# Patient Record
Sex: FEMALE | Race: WHITE | Employment: FULL TIME | URBAN - METROPOLITAN AREA
[De-identification: names, ages, dates, MRNs, and addresses within clinical notes are randomized per-mention and may not be internally consistent; named-entity substitution may affect disease eponyms.]

---

## 2017-01-17 ENCOUNTER — OFFICE VISIT (OUTPATIENT)
Dept: NEUROLOGY | Age: 22
End: 2017-01-17

## 2017-01-17 VITALS
BODY MASS INDEX: 20.23 KG/M2 | WEIGHT: 121.4 LBS | SYSTOLIC BLOOD PRESSURE: 104 MMHG | HEIGHT: 65 IN | RESPIRATION RATE: 20 BRPM | HEART RATE: 87 BPM | OXYGEN SATURATION: 98 % | DIASTOLIC BLOOD PRESSURE: 66 MMHG

## 2017-01-17 DIAGNOSIS — G40.B09 NONINTRACTABLE JUVENILE MYOCLONIC EPILEPSY WITHOUT STATUS EPILEPTICUS (HCC): Primary | ICD-10-CM

## 2017-01-17 RX ORDER — LEVETIRACETAM 500 MG/1
TABLET, EXTENDED RELEASE ORAL
Qty: 150 TAB | Refills: 5 | Status: SHIPPED | OUTPATIENT
Start: 2017-01-17 | End: 2017-07-11 | Stop reason: SDUPTHER

## 2017-01-17 NOTE — PATIENT INSTRUCTIONS

## 2017-01-17 NOTE — MR AVS SNAPSHOT
Visit Information Date & Time Provider Department Dept. Phone Encounter #  
 2017 10:30 AM Melonie Esparza NP Neurology [facility] [place] [phone] [id number] Upcoming Health Maintenance Date Due Hepatitis A Peds Age 1-18 (1 of 2 - Standard Series) 1998 DTaP/Tdap/Td series (1 - Tdap) 2004 HPV AGE 9Y-26Y (1 of 3 - Female 3 Dose Series) 2008 INFLUENZA AGE 9 TO ADULT 2016 Allergies as of 2017  Review Complete On: 2017 By: Melonie Esparza NP No Known Allergies Current Immunizations  Never Reviewed No immunizations on file. Not reviewed this visit You Were Diagnosed With   
  
 Codes Comments Nonintractable juvenile myoclonic epilepsy without [facility]ptic[facility] ([facility]    -  Primary ICD-10-CM: G40. B09 ICD-9-CM: 345.10 Vitals BP Pulse Resp Height(growth percentile) Weight(growth percentile) SpO2  
 104/66 (32 %/ 62 %)* 87 20 5' 5\" (1.651 m) (61 %, Z= 0.27) 121 lb 6.4 oz (55.1 kg) (36 %, Z= -0.35) 98% BMI Smoking Status 20.2 kg/m2 (30 %, Z= -0.52) Never Smoker *BP percentiles are based on NHBPEP's 4th Report Growth percentiles are based on CDC 2-20 Years data. Vitals History BMI and BSA Data Body Mass Index Body Surface Area  
 20.2 kg/m 2 1.59 m 2 [facility]erred [facility] [facility] [address] [phone] Your Updated Medication List  
  
   
This list is accurate as of: 17 11:01 AM.  Always use your most recent med list.  
  
  
  
  
 Antoinette Scarce Insert  into rectum. levETIRAcetam 500 mg ER tablet Commonly known as:  KEPPRA XR  
5 tabs daily. Same generic brand for each refill Prescriptions Sent to Pharmacy Refills  
 levETIRAcetam (KEPPRA XR) 500 mg ER tablet 5 Si tabs daily. Same generic brand for each refill  Class: Normal  
 Pharmacy: [address] [place] [address], [address]  #: [phone] Patient Instructions A Healthy Lifestyle: Care Instructions Your Care Instructions A healthy lifestyle can help you feel good, stay at a healthy weight, and have plenty of energy for both work and play. A healthy lifestyle is something you can share with your whole family. A healthy lifestyle also can lower your risk for serious health problems, such as high blood pressure, heart disease, and diabetes. You can follow a few steps listed below to improve your health and the health of your family. Follow-up care is a key part of your treatment and safety. Be sure to make and go to all appointments, and call your doctor if you are having problems. Its also a good idea to know your test results and keep a list of the medicines you take. How can you care for yourself at home? · Do not eat too much sugar, fat, or fast foods. You can still have dessert and treats now and then. The goal is moderation. · Start small to improve your eating habits. Pay attention to portion sizes, drink less juice and soda pop, and eat more fruits and vegetables. ¨ Eat a healthy amount of food. A 3-ounce serving of meat, for example, is about the size of a deck of cards. Fill the rest of your plate with vegetables and whole grains. ¨ Limit the amount of soda and sports drinks you have every day. Drink more water when you are thirsty. ¨ Eat at least 5 servings of fruits and vegetables every day. It may seem like a lot, but it is not hard to reach this goal. A serving or helping is 1 piece of fruit, 1 cup of vegetables, or 2 cups of leafy, raw vegetables. Have an apple or some carrot sticks as an afternoon snack instead of a candy bar. Try to have fruits and/or vegetables at every meal. 
· Make exercise part of your daily routine.  You may want to start with simple activities, such as walking, bicycling, or slow swimming. Try to be active 30 to 60 minutes every day. You do not need to do all 30 to 60 minutes all at once. For example, you can exercise 3 times a day for 10 or 20 minutes. Moderate exercise is safe for most people, but it is always a good idea to talk to your doctor before starting an exercise program. 
· Keep moving. Dariana Rosas the lawn, work in the garden, or [employer]. Take the stairs instead of the elevator at work. · If you smoke, quit. People who smoke have an increased risk for heart attack, stroke, cancer, and other lung illnesses. Quitting is hard, but there are ways to boost your chance of quitting tobacco for good. ¨ Use nicotine gum, patches, or lozenges. ¨ Ask your doctor about stop-smoking programs and medicines. ¨ Keep trying. In addition to reducing your risk of diseases in the future, you will notice some benefits soon after you stop using tobacco. If you have shortness of breath or asthma symptoms, they will likely get better within a few weeks after you quit. · Limit how much alcohol you drink. Moderate amounts of alcohol (up to 2 drinks a day for men, 1 drink a day for women) are okay. But drinking too much can lead to liver problems, high blood pressure, and other health problems. Family health If you have a family, there are many things you can do together to improve your health. · Eat meals together as a family as often as possible. · Eat healthy foods. This includes fruits, vegetables, lean meats and dairy, and whole grains. · Include your family in your fitness plan. Most people think of activities such as jogging or tennis as the way to fitness, but there are many ways you and your family can be more active. Anything that makes you breathe hard and gets your heart pumping is exercise. Here are some tips: 
¨ Walk to do errands or to take your child to school or the bus. ¨ Go for a family bike ride after dinner instead of watching TV. Where can you learn more? Go to http://yelitza-areli.info/. Enter B026 in the search box to learn more about \"A Healthy Lifestyle: Care Instructions. \" Current as of: July 26, 2016 Content Version: 11.1 © 6946-8047 [employer]. Care instructions adapted under license by [employer] (which disclaims liability or warranty for this information). If you have questions about a medical condition or this instruction, always ask your healthcare professional. [address] any warranty or liability for your use of this information. Introducing [facility] & HE[facility]! Frieda Lowery introduces [employer] patient portal. Now you can access parts of your medical record, email your doctor's office, and request medication refills online. 1. In your internet browser, go to [email or web address] 2. Click on the First Time User? Click Here link in the Sign In box. You will see the New Member Sign Up page. 3. Enter your [employer] Access Code exactly as it appears below. You will not need to use this code after youve completed the sign-up process. If you do not sign up before the expiration date, you must request a new code. · [employer] Access Code: [email or web address] Expires: 3/8/2017 10:16 AM 
 
4. Enter the last four digits of your Social Security Number (xxxx) and Date of Birth (mm/dd/yyyy) as indicated and click Submit. You will be taken to the next sign-up page. 5. Create a Jellit ID. This will be your [employer] login ID and cannot be changed, so think of one that is secure and easy to remember. 6. Create a [employer] password. You can change your password at any time. 7. Enter your Password Reset Question and Answer. This can be used at a later time if you forget your password. 8. Enter your e-mail address.  You will receive e-mail notification when new information is available in [employer]. 9. Click Sign Up. You can now view and download portions of your medical record. 10. Click the Download Summary menu link to download a portable copy of your medical information. If you have questions, please visit the Frequently Asked Questions section of the [employer] website. Remember, [employer] is NOT to be used for urgent needs. For medical emergencies, dial 911. Now available from your iPhone and Android! Please provide this summary of care documentation to your next provider. Your primary care clinician is listed as Hugo Huizar. If you have any questions after today's visit, please call [phone].

## 2017-01-17 NOTE — PROGRESS NOTES
Date:  2017    Name:  Fern Gomez  :  1995  MRN:  [id number]     PCP:  Rhiannon Jones MD    Chief Complaint   Patient presents with    Seizure     HISTORY OF PRESENT ILLNESS: Follow up evaluation of seizure. Last known seizure was at least two years ago. Last summer she was seen and was having some jerking episodes but no LOC. States that this is resolved and she has not had any further episodes of this. Continues to take Keppra XR without difficulty. No new issues or problems. Current Outpatient Prescriptions   Medication Sig    levETIRAcetam (KEPPRA XR) 500 mg ER tablet 5 tabs daily    DIAZEPAM (DIASTAT ACUDIAL RE) Insert  into rectum. No current facility-administered medications for this visit. No Known Allergies  Past Medical History   Diagnosis Date    B[facility] ([facility]     Depression     Epilepsy ([facility]     Falls      History reviewed. No pertinent past surgical history. Social History     Social History    Marital status: SINGLE     Spouse name: N/A    Number of children: N/A    Years of education: N/A     Occupational History    Not on file. Social History Main Topics    Smoking status: Never Smoker    Smokeless tobacco: Not on file    Alcohol use No    Drug use: Not on file    Sexual activity: Not on file     Other Topics Concern    Not on file     Social History Narrative     Family History   Problem Relation Age of Onset    Cancer Other     Seizures Other        PHYSICAL EXAMINATION:    Visit Vitals    /66    Pulse 87    Resp 20    Ht 5' 5\" (1.651 m)    Wt 55.1 kg (121 lb 6.4 oz)    SpO2 98%    BMI 20.2 kg/m2     General:  Well defined, nourished, and groomed individual in no acute distress. Neck: Supple, nontender, no bruits, no pain with resistance to active range of motion. Heart: Regular rate and rhythm, no murmurs, rub, or gallop. Normal S1S2.   Lungs:  Clear to auscultation bilaterally with equal chest expansion, no cough, no wheeze  Musculoskeletal:  Extremities revealed no edema and had full range of motion of joints. Psych:  Good mood and bright affect    NEUROLOGICAL EXAMINATION:     Mental Status:   Alert and oriented to person, place, and time. Cranial Nerves:    II, III, IV, VI:  Visual acuity grossly intact. Visual fields are normal.    Pupils are equal, round, and reactive to light and accommodation. Extra-ocular movements are full and fluid. Fundoscopic exam was benign, no ptosis or nystagmus. V-XII: Hearing is grossly intact. Facial features are symmetric, with normal sensation and strength. The palate rises symmetrically and the tongue protrudes midline. Sternocleidomastoids 5/5. Motor Examination: Normal tone, bulk, and strength, 5/5 muscle strength throughout. Coordination:  Finger to nose was normal.   No resting or intention tremor    Gait and Station:  Steady while walking. Normal arm swing. No pronator drift. No muscle wasting or fasiculations noted. Reflexes:  DTRs 2+ throughout. ASSESSMENT AND PLAN    ICD-10-CM ICD-9-CM    1. 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 B09 345.10 levETIRAcetam (KEPPRA XR) 500 mg ER tablet     Description of possible myoclonic jerking at her last office visit which were likely stress induced. None since then. If these recur, will consider follow up with 24 hour EEG and possible EMU monitoring. Epilepsy is stable on present therapy of Keppra XR. Continue with the same. Follow up in six months    [facility].  Geno Avelar

## 2017-04-19 ENCOUNTER — HOSPITAL ENCOUNTER (EMERGENCY)
Age: 22
Discharge: HOME | End: 2017-04-19
Attending: EMERGENCY MEDICINE | Admitting: EMERGENCY MEDICINE
Payer: SUBSIDIZED

## 2017-04-19 VITALS
HEART RATE: 79 BPM | SYSTOLIC BLOOD PRESSURE: 102 MMHG | DIASTOLIC BLOOD PRESSURE: 71 MMHG | BODY MASS INDEX: 19.11 KG/M2 | TEMPERATURE: 98.4 F | WEIGHT: 114.86 LBS | RESPIRATION RATE: 18 BRPM | OXYGEN SATURATION: 98 %

## 2017-04-19 DIAGNOSIS — N92.0 MENORRHAGIA WITH REGULAR CYCLE: Primary | ICD-10-CM

## 2017-04-19 LAB
APPEARANCE UR: CLEAR
BACTERIA URNS QL MICRO: NEGATIVE /HPF
BILIRUB UR QL: NEGATIVE
CLUE CELLS VAG QL WET PREP: NORMAL
COLOR UR: ABNORMAL
EPITH CASTS URNS QL MICRO: ABNORMAL /LPF
ERYTHROCYTE [DISTWIDTH] IN BLOOD BY AUTOMATED COUNT: 12.6 % (ref 11.5–14.5)
GLUCOSE UR STRIP.AUTO-MCNC: NEGATIVE MG/DL
HCG UR QL: NEGATIVE
HCT VFR BLD AUTO: 37.6 % (ref 35–47)
HGB BLD-MCNC: 12.5 G/DL (ref 11.5–16)
HGB UR QL STRIP: ABNORMAL
HYALINE CASTS URNS QL MICRO: ABNORMAL /LPF (ref 0–5)
KETONES UR QL STRIP.AUTO: NEGATIVE MG/DL
KOH PREP SPEC: NORMAL
LEUKOCYTE ESTERASE UR QL STRIP.AUTO: NEGATIVE
MCH RBC QN AUTO: 29.1 PG (ref 26–34)
MCHC RBC AUTO-ENTMCNC: 33.2 G/DL (ref 30–36.5)
MCV RBC AUTO: 87.6 FL (ref 80–99)
NITRITE UR QL STRIP.AUTO: NEGATIVE
PH UR STRIP: 6.5 [PH] (ref 5–8)
PLATELET # BLD AUTO: 274 K/UL (ref 150–400)
PROT UR STRIP-MCNC: NEGATIVE MG/DL
RBC # BLD AUTO: 4.29 M/UL (ref 3.8–5.2)
RBC #/AREA URNS HPF: ABNORMAL /HPF (ref 0–5)
SERVICE CMNT-IMP: NORMAL
SP GR UR REFRACTOMETRY: 1.03 (ref 1–1.03)
T VAGINALIS VAG QL WET PREP: NORMAL
UROBILINOGEN UR QL STRIP.AUTO: 1 EU/DL (ref 0.2–1)
WBC # BLD AUTO: 7.5 K/UL (ref 3.6–11)
WBC URNS QL MICRO: ABNORMAL /HPF (ref 0–4)

## 2017-04-19 PROCEDURE — 87491 CHLMYD TRACH DNA AMP PROBE: CPT | Performed by: PHYSICIAN ASSISTANT

## 2017-04-19 PROCEDURE — 36415 COLL VENOUS BLD VENIPUNCTURE: CPT | Performed by: PHYSICIAN ASSISTANT

## 2017-04-19 PROCEDURE — 96360 HYDRATION IV INFUSION INIT: CPT

## 2017-04-19 PROCEDURE — 87210 SMEAR WET MOUNT SALINE/INK: CPT | Performed by: PHYSICIAN ASSISTANT

## 2017-04-19 PROCEDURE — 99284 EMERGENCY DEPT VISIT MOD MDM: CPT

## 2017-04-19 PROCEDURE — 85027 COMPLETE CBC AUTOMATED: CPT | Performed by: PHYSICIAN ASSISTANT

## 2017-04-19 PROCEDURE — 81001 URINALYSIS AUTO W/SCOPE: CPT | Performed by: EMERGENCY MEDICINE

## 2017-04-19 PROCEDURE — 81025 URINE PREGNANCY TEST: CPT

## 2017-04-19 PROCEDURE — 74011250636 HC RX REV CODE- 250/636: Performed by: PHYSICIAN ASSISTANT

## 2017-04-19 RX ADMIN — SODIUM CHLORIDE 1000 ML: 900 INJECTION, SOLUTION INTRAVENOUS at 21:31

## 2017-04-19 NOTE — LETTER
[address] 
[address] [facility] DEPT 
[facility] [id number] 
[phone] Work/School Note Date: 4/19/2017 To Whom It May concern: 
 
Brandy Solorio was seen and treated today in the emergency room by the following provider(s): 
Attending Provider: Stevie Dillon MD 
Physician Assistant: Luis Acuna, [address] [place], [place] [address]spike Flores. Brandy Solorio may return to work on 4/21/17. Sincerely, RAMANDEEP Duke

## 2017-04-20 NOTE — ED NOTES
Education: Patient educated on importance of follow-up with gynecologist.     Vanita Lee even and unlabored. Skin warm, pink, and dry. Discharge instructions reviewed with patient by Elías Martinez and RN. Patient ambulatory from room with father. Gait strong and steady, no distress noted upon discharge.

## 2017-04-20 NOTE — ED PROVIDER NOTES
HPI Comments: 23year old female presenting to the ED for multiple complaints. Pt reports that she has Implanon that is scheduled to be removed in August.  Notes that her period started to happen every month a couple of months ago. Pt notes that her most recent period started last night, notes that this would be about the normal time for her to get her period. Notes that today she has gone through about 3 super tampons today. Pt reports dyspareunia in the last couple of weeks, reports pelvic pain with intercourse. No vaginal discharge other than her usual healthy white discharge. No vomiting or diarrhea, + nausea. No fever. Pt had a UTI a few weeks ago, denies urinary symptoms now. Pt also notes that she has had some pain in the left leg today, attributes pain to starting her new job which requires a lot of walking. Patient denies hx VTE, recent immobilization, exogenous estrogen use, hemoptysis, leg swelling. Pt has not seen a GYN in a couple years. PMHx: epilepsy, bipolar, depression  Social: non-smoker. Works for [employer]      Patient is a 23 y.o. female presenting with anorexia nervosa. The history is provided by the patient. Anorexia   Pertinent negatives include no chest pain, no abdominal pain and no shortness of breath. Past Medical History:   Diagnosis Date    B[facility] 1 disorder ([facility]     Depression     bipolar    Epilepsy ([facility]     Falls        History reviewed. No pertinent surgical history. Family History:   Problem Relation Age of Onset    Cancer Other     Seizures Other        Social History     Social History    Marital status: SINGLE     Spouse name: N/A    Number of children: N/A    Years of education: N/A     Occupational History    Not on file.      Social History Main Topics    Smoking status: Never Smoker    Smokeless tobacco: Not on file    Alcohol use No    Drug use: Not on file    Sexual activity: Not on file     Other Topics Concern    Not on file Social History Narrative         ALLERGIES: Review of patient's allergies indicates no known allergies. Review of Systems   Constitutional: Negative for fatigue and fever. Eyes: Negative for discharge. Respiratory: Negative for shortness of breath. Cardiovascular: Negative for chest pain. Gastrointestinal: Negative for abdominal pain and vomiting. Genitourinary: Positive for dyspareunia and vaginal bleeding. Negative for difficulty urinating, dysuria, pelvic pain and vaginal discharge. Musculoskeletal: Negative for neck stiffness. Skin: Negative for wound. Neurological: Negative for dizziness, syncope and light-headedness. Psychiatric/Behavioral: Negative for behavioral problems. All other systems reviewed and are negative. Vitals:    04/19/17 2024 04/19/17 2036 04/19/17 2039   BP:  102/71    Pulse:  79    Resp:  18    Temp:   98.4 °F (36.9 °C)   SpO2:  98%    Weight: 52.1 kg (114 lb 13.8 oz)              Physical Exam   Constitutional: She is oriented to person, place, and time. She appears well-developed and well-nourished. No distress. Pleasant WF   HENT:   Head: Normocephalic and atraumatic. Right Ear: External ear normal.   Left Ear: External ear normal.   Eyes: Conjunctivae are normal. No scleral icterus. Neck: Neck supple. No tracheal deviation present. Cardiovascular: Normal rate, regular rhythm and normal heart sounds. Exam reveals no gallop and no friction rub. No murmur heard. Pulmonary/Chest: Effort normal and breath sounds normal. No stridor. No respiratory distress. She has no wheezes. Abdominal: Soft. She exhibits no distension. There is no tenderness. There is no rebound and no guarding. Genitourinary:   Genitourinary Comments: Small amount of dark red blood in the vault  No cervical lesions  No CMT  No uterine or adnexal mass or TTP   Musculoskeletal: Normal range of motion.    Small area of pain to the left lateral distal calf, no erythema, warmth, or edema  NVID   Neurological: She is alert and oriented to person, place, and time. Skin: Skin is warm and dry. Psychiatric: She has a normal mood and affect. Her behavior is normal.   Nursing note and vitals reviewed. MDM  Number of Diagnoses or Management Options  Menorrhagia with regular cycle:   Diagnosis management comments: 23year old female presenting for vaginal bleeding, notes that she started her MP last night and bleeding was heavier than usual, used 3 tampons today. No pelvic pain or fever. Reassuring exam, VS, HGB. Discussed with pt need for GYN follow up for ongoing dyspareunia, also for PAP and yearly health maintenance. Discussed return precautions, follow up instructions. 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        Amount and/or Complexity of Data Reviewed  Clinical lab tests: ordered and reviewed  Discuss the patient with other providers: yes (Dr. Maryuri Correa, ED attending)      ED Course       Procedures

## 2017-04-20 NOTE — DISCHARGE INSTRUCTIONS
Heavy Menstrual Periods: Care Instructions  Your Care Instructions    Many women get heavy menstrual periods and painful cramps. For some women, this means passing large blood clots and changing sanitary pads or tampons often. You may also have periods that last longer than 7 days. A change in hormones or an irritation in the uterus can cause heavy bleeding. Women who are overweight are more likely to have heavy menstrual periods. But there may not be a specific cause for your heavy menstrual periods. Your doctor may recommend hormone treatments to slow or stop your periods. If a fibroid (a growth that is not cancer) is causing your heavy bleeding, your doctor may recommend surgery or other treatments to remove the growth. Because blood loss from heavy menstrual periods can make you very tired and weak (anemic), your doctor may recommend that you take extra iron. Follow-up care is a key part of your treatment and safety. Be sure to make and go to all appointments, and call your doctor if you are having problems. It's also a good idea to know your test results and keep a list of the medicines you take. How can you care for yourself at home? · Get plenty of rest.  · Keep a record of your periods. Write down when your period begins and ends and how much flow you have. That means counting the number of pads and tampons you use. Note whether they are soaked. Note any other symptoms. Take this record to your doctor appointments. · Take your medicines exactly as prescribed. Call your doctor if you think you are having a problem with your medicine. · Take pain medicines exactly as directed. ¨ If the doctor gave you a prescription medicine for pain, take it as prescribed. ¨ If you are not taking a prescription pain medicine, ask your doctor if you can take an over-the-counter medicine. · Try to reach a healthy weight. If you are trying to lose weight, do it slowly with your doctor's advice.   · If you are taking iron pills:  ¨ Try to take the pills about 1 hour before or 2 hours after meals. But you may need to take iron with some food to avoid an upset stomach. ¨ Vitamin C (from food or pills) helps your body absorb iron. Try taking iron pills with a glass of orange juice or other citrus fruit juice. ¨ Do not take antacids or drink milk or caffeine drinks (such as coffee, tea, or cola) at the same time or within 2 hours of the time that you take your iron. They can make it hard for your body to absorb the iron. ¨ Iron pills may cause stomach problems, such as heartburn, nausea, diarrhea, constipation, and cramps. Be sure to drink plenty of fluids, and include fruits, vegetables, and fiber in your diet each day. ¨ If you forget to take an iron pill, do not take a double dose of iron the next time you take a pill. ¨ Keep iron pills out of the reach of small children. An overdose of iron can be very dangerous. When should you call for help? Call 911 anytime you think you may need emergency care. For example, call if:  · You passed out (lost consciousness). · You have sudden, severe pain in your belly or pelvis. Call your doctor now or seek immediate medical care if:  · You have severe vaginal bleeding. This means that you are soaking through your usual pads or tampons each hour for 2 or more hours. · You are dizzy or lightheaded, or you feel like you may faint. · You have belly or pelvic pain when not menstruating. · You have a fever. · You have vaginal discharge with a bad odor. Watch closely for changes in your health, and be sure to contact your doctor if:  · Your heavy periods are disrupting your life. · You have vaginal bleeding when you do not expect it or after menopause. · You do not get better as expected. Where can you learn more? Go to http://yelitza-areli.info/. Enter F477 in the search box to learn more about \"Heavy Menstrual Periods: Care Instructions. \"  Current as of: October 13, 2016  Content Version: 11.2  © 3535-7652 "[employer] - [employer] Simple[employer] Fast and Elegant Tablet [employer] App", [employer]. Care instructions adapted under license by [employer] (which disclaims liability or warranty for this information). If you have questions about a medical condition or this instruction, always ask your healthcare professional. [address] any warranty or liability for your use of this information.

## 2017-04-21 LAB
C TRACH DNA SPEC QL NAA+PROBE: NEGATIVE
N GONORRHOEA DNA SPEC QL NAA+PROBE: NEGATIVE
SAMPLE TYPE: NORMAL
SERVICE CMNT-IMP: NORMAL
SPECIMEN SOURCE: NORMAL

## 2017-07-11 ENCOUNTER — OFFICE VISIT (OUTPATIENT)
Dept: NEUROLOGY | Age: 22
End: 2017-07-11

## 2017-07-11 VITALS
BODY MASS INDEX: 19.49 KG/M2 | DIASTOLIC BLOOD PRESSURE: 70 MMHG | HEIGHT: 65 IN | HEART RATE: 72 BPM | RESPIRATION RATE: 18 BRPM | TEMPERATURE: 98 F | WEIGHT: 117 LBS | SYSTOLIC BLOOD PRESSURE: 110 MMHG | OXYGEN SATURATION: 99 %

## 2017-07-11 DIAGNOSIS — G40.B09 NONINTRACTABLE JUVENILE MYOCLONIC EPILEPSY WITHOUT STATUS EPILEPTICUS (HCC): ICD-10-CM

## 2017-07-11 RX ORDER — LEVETIRACETAM 500 MG/1
TABLET, EXTENDED RELEASE ORAL
Qty: 150 TAB | Refills: 5 | Status: SHIPPED | OUTPATIENT
Start: 2017-07-11 | End: 2018-06-27 | Stop reason: SDUPTHER

## 2017-07-11 NOTE — PROGRESS NOTES
[facility] [address]   [place] [address]   [address]   [phone] SBXLZ   [phone] Fax               Chief Complaint   Patient presents with    Seizure     follow up     Current Outpatient Prescriptions   Medication Sig Dispense Refill    levETIRAcetam (KEPPRA XR) 500 mg ER tablet 5 tabs daily. Same generic brand for each refill 150 Tab 5    DIAZEPAM (DIASTAT ACUDIAL RE) Insert  into rectum. No Known Allergies  Social History   Substance Use Topics    Smoking status: Never Smoker    Smokeless tobacco: Never Used    Alcohol use No     Ms. Robson Brito returns today for follow-up juvenile myoclonic epilepsy. She is maintained on Keppra. She has not had any seizures. She has not had any occult seizure. Denies any issue. Tolerating medicines. She endorses compliance. She overall notes she is doing quite well. She was stressed having some employment issues but now has a new job at [employer]. Quite happy about that. Overall feels she is doing well. Examination  Visit Vitals    Ht 5' 5\" (1.651 m)    Wt 53.1 kg (117 lb)    BMI 19.47 kg/m2     She looks well. She is very pleasant cooperative and interactive. No icterus. No edema. She has intact cranial nerves II-XII. No nystagmus. No pronation or drift. No ataxia. Her gait is steady. Impression/Plan  Juvenile myoclonic epilepsy well-controlled on Keppra. Continue that. No signs of clinical toxicity in terms of her medications. As long as she does well follow-up in 6 months      This note was created using voice recognition software. Despite editing, there may be syntax errors. This note will not be viewable in [address]

## 2017-07-11 NOTE — PROGRESS NOTES
Follow up for seizure activity. Reports no seizure activity since last visit. Reports some twitching. No acute problems reported.

## 2017-07-11 NOTE — PATIENT INSTRUCTIONS
Information Regarding Testing     If you have physican order for a test or a medication denied by your insurance company, this does not mean the test or medication is not appropriate for you as that is a medical decision, not a decision to be made by an insurance company representative or by an [facility] physician who has not interviewed and examined you. This is a decision to be made between you and your physician. The denial of services is a contractual matter between you and your insurance company, not an issue between your physician and the insurance company. If your test or medication is denied, you can take the following steps to help resolve the issue:    1. File a complaint with the [facility] regarding your insurance company's denial of services ordered for you. You can do this either by calling them directly or by completing an on-line complaint form on the [employer]. This can be found at [email or web address]    2. Also file a formal complaint with your insurance company and ask to have the name of the person denying the service so that you may explore a legal option should you be harmed by this denial of service. Again, the fact the insurance company will not pay for the service does not mean it is not medically necessary and I would encourage you to follow through with the plan that was made with your physician    3. File a written complaint with your employer so your employer and benefit manager is aware of the poor coverage they are providing their employees. If you have medicare/medicaid, complain to your representative in the House and to your Katherine Escamilla.     [address] [facility]   Statement to Patients  April 1, 2014      In an effort to ensure the large volume of patient prescription refills is processed in the most efficient and expeditious manner, we are asking our patients to assist us by calling your Pharmacy for all prescription refills, this will include also your  Mail Order Pharmacy. The pharmacy will contact our office electronically to continue the refill process. Please do not wait until the last minute to call your pharmacy. We need at least 48 hours (2days) to fill prescriptions. We also encourage you to call your pharmacy before going to  your prescription to make sure it is ready. With regard to controlled substance prescription refill requests (narcotic refills) that need to be picked up at our office, we ask your cooperation by providing us with at least 72 hours (3days) notice that you will need a refill. We will not refill narcotic prescription refill requests after 4:00pm on any weekday, Monday through Thursday, or after 2:00pm on Fridays, or on the weekends. We encourage everyone to explore another way of getting your prescription refill request processed using Tiempo, our patient web portal through our electronic medical record system. Tiempo is an efficient and effective way to communicate your medication request directly to the office and  downloadable as an amrit on your smart phone . Tiempo also features a review functionality that allows you to view your medication list as well as leave messages for your physician. Are you ready to get connected? If so please review the attatched instructions or speak to any of our staff to get you set up right away! Thank you so much for your cooperation. Should you have any questions please contact our Practice Administrator. The Physicians and Staff,  [facility]       If we have ordered testing for you, we do not call patients with results and we do not give test results over the phone. We schedule follow up appointments so that your results can be discussed in person and any questions you have regarding them may be addressed.   If something of concern is revealed on your test, we will call you for a sooner follow up appointment. Additionally, results may be found by using the My Chart feature and one of our patient service representatives at the  can give you instructions on how to access this feature of our electronic medical record system. Learning About Living Michelle  What is a living will? A living will is a legal form you use to write down the kind of care you want at the end of your life. It is used by the health professionals who will treat you if you aren't able to decide for yourself. If you put your wishes in writing, your loved ones and others will know what kind of care you want. They won't need to guess. This can ease your mind and be helpful to others. A living will is not the same as an estate or property will. An estate will explains what you want to happen with your money and property after you die. Is a living will a legal document? A living will is a legal document. Each state has its own laws about living humphrey. If you move to another state, make sure that your living will is legal in the state where you now live. Or you might use a universal form that has been approved by many states. This kind of form can sometimes be completed and stored online. Your electronic copy will then be available wherever you have a connection to the Internet. In most cases, doctors will respect your wishes even if you have a form from a different state. · You don't need an  to complete a living will. But legal advice can be helpful if your state's laws are unclear, your health history is complicated, or your family can't agree on what should be in your living will. · You can change your living will at any time. Some people find that their wishes about end-of-life care change as their health changes. · In addition to making a living will, think about completing a medical power of  form.  This form lets you name the person you want to make end-of-life treatment decisions for you (your \"health care agent\") if you're not able to. Many hospitals and nursing homes will give you the forms you need to complete a living will and a medical power of . · Your living will is used only if you can't make or communicate decisions for yourself anymore. If you become able to make decisions again, you can accept or refuse any treatment, no matter what you wrote in your living will. · Your state may offer an online registry. This is a place where you can store your living will online so the doctors and nurses who need to treat you can find it right away. What should you think about when creating a living will? Talk about your end-of-life wishes with your family members and your doctor. Let them know what you want. That way the people making decisions for you won't be surprised by your choices. Think about these questions as you make your living will:  · Do you know enough about life support methods that might be used? If not, talk to your doctor so you know what might be done if you can't breathe on your own, your heart stops, or you're unable to swallow. · What things would you still want to be able to do after you receive life-support methods? Would you want to be able to walk? To speak? To eat on your own? To live without the help of machines? · If you have a choice, where do you want to be cared for? In your home? At a hospital or nursing home? · Do you want certain Scientologist practices performed if you become very ill? · If you have a choice at the end of your life, where would you prefer to die? At home? In a hospital or nursing home? Somewhere else? · Would you prefer to be buried or cremated? · Do you want your organs to be donated after you die? What should you do with your living will? · Make sure that your family members and your health care agent have copies of your living will. · Give your doctor a copy of your living will to keep in your medical record.  If you have more than one doctor, make sure that each one has a copy. · You may want to put a copy of your living will where it can be easily found. Where can you learn more? Go to http://yelitza-areli.info/. Enter T143 in the search box to learn more about \"Learning About Living Perroy. \"  Current as of: August 8, 2016  Content Version: 11.3  © 0519-7965 [employer]. Care instructions adapted under license by [employer] (which disclaims liability or warranty for this information). If you have questions about a medical condition or this instruction, always ask your healthcare professional. [address] any warranty or liability for your use of this information.

## 2017-07-11 NOTE — LETTER
NOTIFICATION RETURN TO WORK / SCHOOL 
 
7/11/2017 11:03 AM 
 
Ms. La Nena Horowitz [address] [address] To Whom It May Concern: 
 
La Nena Horowitz is currently under the care of [address] [place] [address]. She will return to work on: 07/11/2017. If there are questions or concerns please have the patient contact our office. Sincerely, Alessia Cohn MD

## 2018-01-29 ENCOUNTER — TELEPHONE (OUTPATIENT)
Dept: NEUROLOGY | Age: 23
End: 2018-01-29

## 2018-01-29 NOTE — TELEPHONE ENCOUNTER
----- Message from Catina Mednia sent at 1/29/2018  9:38 AM EST -----  Regarding: /telephone   Pt is requesting a call back in regards to RX. Best contact [phone].

## 2018-01-30 ENCOUNTER — TELEPHONE (OUTPATIENT)
Dept: NEUROLOGY | Age: 23
End: 2018-01-30

## 2018-01-30 DIAGNOSIS — G40.B09 NONINTRACTABLE JUVENILE MYOCLONIC EPILEPSY WITHOUT STATUS EPILEPTICUS (HCC): ICD-10-CM

## 2018-01-30 RX ORDER — LEVETIRACETAM 500 MG/1
TABLET, EXTENDED RELEASE ORAL
Qty: 150 TAB | Refills: 5 | Status: SHIPPED | OUTPATIENT
Start: 2018-01-30 | End: 2018-09-27 | Stop reason: SDUPTHER

## 2018-07-31 ENCOUNTER — OFFICE VISIT (OUTPATIENT)
Dept: NEUROLOGY | Age: 23
End: 2018-07-31

## 2018-07-31 VITALS
OXYGEN SATURATION: 99 % | DIASTOLIC BLOOD PRESSURE: 80 MMHG | WEIGHT: 150 LBS | HEART RATE: 84 BPM | SYSTOLIC BLOOD PRESSURE: 112 MMHG | BODY MASS INDEX: 24.99 KG/M2 | TEMPERATURE: 98.4 F | HEIGHT: 65 IN

## 2018-07-31 DIAGNOSIS — R63.5 WEIGHT GAIN: ICD-10-CM

## 2018-07-31 DIAGNOSIS — Z51.81 MEDICATION MONITORING ENCOUNTER: ICD-10-CM

## 2018-07-31 DIAGNOSIS — G40.B09 NONINTRACTABLE JUVENILE MYOCLONIC EPILEPSY WITHOUT STATUS EPILEPTICUS (HCC): Primary | ICD-10-CM

## 2018-07-31 RX ORDER — LEVETIRACETAM 500 MG/1
TABLET, EXTENDED RELEASE ORAL
Qty: 150 TAB | Refills: 1 | Status: SHIPPED | OUTPATIENT
Start: 2018-07-31 | End: 2018-09-27 | Stop reason: SDUPTHER

## 2018-07-31 NOTE — LETTER
NOTIFICATION RETURN TO WORK 
 
7/31/2018 10:00 AM 
 
Ms. Kaur Mendiola [address] [address] To Whom It May Concern: 
 
Kaur Mendiola was seen today by Dr. Merritt Kussmaul at the [address] [place] [address]. She will return to work/school on: 07/31/18 If there are questions or concerns please have the patient contact our office. Sincerely, Deana Archibald MD

## 2018-07-31 NOTE — PROGRESS NOTES
Wandy We-07-A 1498 13 [facility] [place] [address]  
210 [facility] [place] 35 [id number] Fax Chief Complaint Patient presents with  Seizure  
  follow up Current Outpatient Prescriptions Medication Sig Dispense Refill  levETIRAcetam (KEPPRA XR) 500 mg ER tablet TAKE FIVE TABLETS BY MOUTH DAILY 150 Tab 5  levETIRAcetam (KEPPRA XR) 500 mg ER tablet 5 tabs daily. Same generic brand for each refill 150 Tab 5  DIAZEPAM (DIASTAT ACUDIAL RE) Insert  into rectum. No Known Allergies Social History Substance Use Topics  Smoking status: Never Smoker  Smokeless tobacco: Never Used  Alcohol use No  
 
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 She has been maintained on Keppra. Since her last visit she denies any seizure. She does indicate that sometimes when she is stressed she will have the sensation that she may want to have a jerk or jerking type sensation. She has not had any staring spells. No convulsions. She is now working as a . She has been compliant with her medications. She has gained a good amount of weight noting that she has gone from about 115-120 pounds now she is weighing 150 pounds. She does endorse that she has struggled with some eating issues and she wonders if she is at a healthy weight and we discussed that her BMI is normal.  She discusses how she is coming out of her clothing etc. and she does not want to be overweight and has some concerns about gaining weight very quickly. She knows that is not the Levester [place] doing that because she has been on Keppra for a long time. She has started some Celexa for anxiety and does indicate she discussed with her physician Celexa does not do that. We discussed healthy eating and discussed eating disorders and discussed that sometimes hormonal issues can be related to weight loss.   We also discussed that she appears healthy. We discussed that sometimes one can have a disordered perception of weight and that what one perceives as being an abnormal weight certainly can be a normal weight. We discussed having her see Dr. Jim vAalos to discuss weight issues including healthy weight, weight loss strategies, healthy eating and perception of weight to give her an idea about that as that is what Dr. Nina Frias specializes in that she is willing to do that. Examination Visit Vitals  /80  Pulse 84  Temp 98.4 °F (36.9 °C)  Ht 5' 5\" (1.651 m)  Wt 68 kg (150 lb)  SpO2 99%  BMI 24.96 kg/m2 She looks well. She has no icterus. No edema. She is awake alert oriented and conversant. She has normal speech-language cognition attention. No cranial nerve deficit. No ataxia. Gait steady. Impression/Plan Juvenile myoclonic epilepsy with some indication that she may have some jerking movements when under stress and question whether this may be some breakthrough myoclonus which would certainly be concerning and to that and I did go back and review her 2000 1624 hour EEG and that was normal.  We will get a Keppra level and if that is on the lower end of normal we will go ahead and increase her dose of Keppra just to air on the side of caution. We will get the Keppra level and call her with recommendations regarding increasing her Keppra dose or not and very well may likely just increase that just again to be more cautious. Again discussed Keppra should not cause any weight gain. She is aware of that. She has been very well controlled on Keppra. In terms of her perception of abnormal weight we discussed the normal BMI and discussed issues as noted above. We will give her Dr. Zari Wesley phone number and she will make an appointment to discuss those issues with her and get advice in that regard. We will set a formal follow-up after we touch base by telephone regarding what to do about the [address]  
 
Total time: 25 min Counseling / coordination time: 15 min  
> 50% counseling / coordination?: Yes re: as documented above Aracely Lauren MD 
 
 
This note was created using voice recognition software. Despite editing, there may be syntax errors. This note will not be viewable in [address]

## 2018-07-31 NOTE — MR AVS SNAPSHOT
[address] [address] 
[phone] Patient: Mavis Bautista MRN: [id number] ABX:[id number]/[id number] Visit Information Date & Time Provider Department Dept. Phone Encounter #  
 7/31/2018  9:20 AM Sai Dawson MD [facility] [phone] [id number] Follow-up Instructions Return for well call with lab result and schedule follow up. Upcoming Health Maintenance Date Due  
 HPV Age 9Y-34Y (1 of 3 - Female 3 Dose Series) 1/17/2008 DTaP/Tdap/Td series (1 - Tdap) 1/17/2018 PAP AKA CERVICAL CYTOLOGY 1/17/2018 Influenza Age 5 to Adult 8/1/2018 Allergies as of 7/31/2018  Review Complete On: 7/23/2017 By: Sai Dawson MD  
 No Known Allergies Current Immunizations  Never Reviewed No immunizations on file. Not reviewed this visit You Were Diagnosed With   
  
 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 B09 ICD-9-CM: 345.10 Medication monitoring encounter     ICD-10-CM: Z51.81 
ICD-9-CM: V58.83 Weight gain     ICD-10-CM: R63.5 ICD-9-CM: 783.1 Vitals BP Pulse Temp Height(growth percentile) Weight(growth percentile) SpO2  
 112/80 84 98.4 °F (36.9 °C) 5' 5\" (1.651 m) 150 lb (68 kg) 99% BMI Smoking Status 24.96 kg/m2 Never Smoker BMI and BSA Data Body Mass Index Body Surface Area 24.96 kg/m 2 1.77 m 2 [facility] [address] [phone] Your Updated Medication List  
  
   
This list is accurate as of 7/31/18  9:53 AM.  Always use your most recent med list.  
  
  
  
  
 Brar Raid Insert  into rectum. * levETIRAcetam 500 mg ER tablet Commonly known as:  KEPPRA XR  
TAKE FIVE TABLETS BY MOUTH DAILY * levETIRAcetam 500 mg ER tablet Commonly known as:  KEPPRA XR  
5 tabs daily. Same generic brand for each refill * Notice: This list has 2 medication(s) that are the same as other medications prescribed for you. Read the directions carefully, and ask your doctor or other care provider to review them with you. Prescriptions Sent to Pharmacy Refills  
 levETIRAcetam (KEPPRA XR) 500 mg ER tablet 1 Si tabs daily. Same generic brand for each refill Class: Normal  
 Pharm[facility]: [address] [place], [place] [address] 25 Ph #: [phone] We Performed the Following LEVETIRACETAM (KEPPRA) [id number] CPT(R)] REFERRAL TO FAMILY PRACTICE [ATV31 Custom] Comments:  
 Questions re weight loss/ ideal weight/weight gain etc  
  
Follow-up Instructions Return for well call with lab result and schedule follow up. Referral Information Referral ID Referred By Referred To  
  
 [id number] [place], [id number] [place] [address] MD Laisha   
   [address] [place] [address] Phone: [phone] Fax: [phone] Visits Status Start Date End Date 1 New Request 18 If your referral has a status of pending review or denied, additional information will be sent to support the outcome of this decision. [facility]! [facility] introduces [email or web address] patient portal. Now you can access parts of your medical record, email your doctor's office, and request medication refills online. 1. In your internet browser, go to [email or web address] 2. Click on the First Time User? Click Here link in the Sign In box. You will see the New Member Sign Up page. 3. Enter your [email or web address].com Access Code exactly as it appears below. You will not need to use this code after youve completed the sign-up process. If you do not sign up before the expiration date, you must request a new code. · Mines.io Access Code: [email or web address] Expires: 10/29/2018  9:53 AM 
 
4. Enter the last four digits of your Social Security Number (xxxx) and Date of Birth (mm/dd/yyyy) as indicated and click Submit. You will be taken to the next sign-up page. 5. Create a Mines.io ID. This will be your Mines.io login ID and cannot be changed, so think of one that is secure and easy to remember. 6. Create a Mines.io password. You can change your password at any time. 7. Enter your Password Reset Question and Answer. This can be used at a later time if you forget your password. 8. Enter your e-mail address. You will receive e-mail notification when new information is available in [address] 9. Click Sign Up. You can now view and download portions of your medical record. 10. Click the Download Summary menu link to download a portable copy of your medical information. If you have questions, please visit the Frequently Asked Questions section of the Mines.io website. Remember, Mines.io is NOT to be used for urgent needs. For medical emergencies, dial 911. Now available from your iPhone and Android! Please provide this summary of care documentation to your next provider. Your primary care clinician is listed as Hugo Huizar. If you have any questions after today's visit, please call [phone].

## 2018-07-31 NOTE — LETTER
NOTIFICATION RETURN TO WORK 
 
7/31/2018 9:59 AM 
 
Ms. Madhuri Chin [address] [facility] [address] To Whom It May Concern: 
 
Madhuri Chin was seen at the [facility] on [address] in [place], [address] She will return to work/school on: 07/31/18 If there are questions or concerns please have the patient contact our office. Sincerely, Camelia Finley MD

## 2018-08-07 ENCOUNTER — TELEPHONE (OUTPATIENT)
Dept: NEUROLOGY | Age: 23
End: 2018-08-07

## 2018-08-07 NOTE — TELEPHONE ENCOUNTER
----- Message from Platt Knock sent at 8/7/2018  1:09 PM EDT -----  Regarding: Dr. Ruth Daly  The patient is requesting a call back from the doctor or nurse in regards to the cost of the blood work that she is supposed to have done.  ([phone]

## 2018-08-07 NOTE — TELEPHONE ENCOUNTER
Patient states that she can not afford to do blood work at this time, patient states that she doesn't have any insurance and this would cost her 200 dollars, patient would like to stay on Keppra  mg 5 tabs daily for now

## 2018-09-27 DIAGNOSIS — G40.B09 NONINTRACTABLE JUVENILE MYOCLONIC EPILEPSY WITHOUT STATUS EPILEPTICUS (HCC): ICD-10-CM

## 2018-09-27 RX ORDER — LEVETIRACETAM 500 MG/1
TABLET, EXTENDED RELEASE ORAL
Qty: 150 TAB | Refills: 5 | Status: SHIPPED | OUTPATIENT
Start: 2018-09-27 | End: 2019-04-11 | Stop reason: SDUPTHER

## 2018-09-27 NOTE — TELEPHONE ENCOUNTER
----- Message from Marco Patiño sent at 9/27/2018  1:16 PM EDT -----  Regarding: Dr. Alonzo Valdezudent  The patient is requesting that the doctor calls in a refill for Rx Keppra XR into the pharmacy.  [employer] on file) (k)[phone]

## 2018-09-27 NOTE — TELEPHONE ENCOUNTER
----- Message from Akila Long sent at 9/27/2018  1:18 PM EDT -----  Regarding: Dr. Honey Ernst  The patient is requesting a call back to confirm if she needs to have blood work done since her tremors have stopped since beginning anxiety medication.  ([phone]

## 2018-10-03 ENCOUNTER — TELEPHONE (OUTPATIENT)
Dept: NEUROLOGY | Age: 23
End: 2018-10-03

## 2018-10-03 NOTE — TELEPHONE ENCOUNTER
----- Message from Radha Rico NP sent at 9/27/2018  1:59 PM EDT -----  Can hold on blood work   Call if things change

## 2019-04-10 ENCOUNTER — TELEPHONE (OUTPATIENT)
Dept: NEUROLOGY | Age: 24
End: 2019-04-10

## 2019-04-10 DIAGNOSIS — G40.B09 NONINTRACTABLE JUVENILE MYOCLONIC EPILEPSY WITHOUT STATUS EPILEPTICUS (HCC): ICD-10-CM

## 2019-04-10 NOTE — TELEPHONE ENCOUNTER
Pt stated that she doesn't have enough medication to make it to her next appointment     levETIRAcetam (KEPPRA XR) 500 mg ER tablet

## 2019-04-11 RX ORDER — LEVETIRACETAM 500 MG/1
TABLET, EXTENDED RELEASE ORAL
Qty: 150 TAB | Refills: 0 | Status: SHIPPED | OUTPATIENT
Start: 2019-04-11 | End: 2019-05-14 | Stop reason: SDUPTHER

## 2019-05-14 ENCOUNTER — OFFICE VISIT (OUTPATIENT)
Dept: NEUROLOGY | Age: 24
End: 2019-05-14

## 2019-05-14 VITALS
WEIGHT: 138 LBS | DIASTOLIC BLOOD PRESSURE: 74 MMHG | BODY MASS INDEX: 22.99 KG/M2 | OXYGEN SATURATION: 95 % | RESPIRATION RATE: 16 BRPM | HEIGHT: 65 IN | HEART RATE: 71 BPM | SYSTOLIC BLOOD PRESSURE: 100 MMHG

## 2019-05-14 DIAGNOSIS — G40.B09 NONINTRACTABLE JUVENILE MYOCLONIC EPILEPSY WITHOUT STATUS EPILEPTICUS (HCC): ICD-10-CM

## 2019-05-14 RX ORDER — LEVETIRACETAM 500 MG/1
TABLET, EXTENDED RELEASE ORAL
Qty: 150 TAB | Refills: 11 | Status: SHIPPED | OUTPATIENT
Start: 2019-05-14 | End: 2019-12-17

## 2019-05-14 RX ORDER — LEVETIRACETAM 500 MG/1
TABLET, EXTENDED RELEASE ORAL
Qty: 150 TAB | Refills: 3 | Status: SHIPPED | OUTPATIENT
Start: 2019-05-14 | End: 2019-05-14 | Stop reason: SDUPTHER

## 2019-05-14 NOTE — PROGRESS NOTES
[facility] and [address] Flores at [facility] at [facility] [address] [place], [address] [address] [address] [address] [facility], [address] [place] [address]  
[phone] Chief Complaint Patient presents with  Seizure  
  last seizure was 2012 or 2013 Current Outpatient Medications Medication Sig Dispense Refill  levETIRAcetam (KEPPRA XR) 500 mg ER tablet 5 tabs daily. Same generic brand for each refill 150 Tab 3  
 DIAZEPAM (DIASTAT ACUDIAL RE) Insert  into rectum. No Known Allergies Social History Tobacco Use  Smoking status: Never Smoker  Smokeless tobacco: Never Used Substance Use Topics  Alcohol use: No  
 Drug use: Not on file Patient returns today for follow-up of juvenile myoclonic epilepsy. She is maintained on Keppra 2500 mg of the extended release once daily. At her last visit we are going to check a Keppra level but she did not have insurance and it would have cost her an exorbitant amount of money. We deferred that. She continued on 2500 mg of Keppra daily. Since her last visit she reports no seizure. No perceived side effects. No occult seizure symptom. She continues to work. Examination Visit Vitals /74 (BP 1 Location: Right arm, BP Patient Position: Sitting) Pulse 71 Resp 16 Ht 5' 5\" (1.651 m) Wt 62.6 kg (138 lb) LMP 05/13/2019 SpO2 95% BMI 22.96 kg/m² Awake, alert and oriented. No icterus. CN intact 2-12 without nystagmus. No pronation or drift. Resists fully in all 4 extrems. DTR symmetric in all 4 extremities. No ataxia. Steady gait. Impression/Plan Juvenile myoclonic epilepsy stable. Continue the same dose of Keppra. Follow in 1 year unless circumstances dictate otherwise. Shaka Nettles MD 
 
 
This note was created using voice recognition software.  Despite editing, there may be syntax errors. This note will not be viewable in [address]

## 2019-09-16 DIAGNOSIS — G40.B09 NONINTRACTABLE JUVENILE MYOCLONIC EPILEPSY WITHOUT STATUS EPILEPTICUS (HCC): ICD-10-CM

## 2019-09-16 RX ORDER — LEVETIRACETAM 500 MG/1
TABLET, EXTENDED RELEASE ORAL
Qty: 150 TAB | Refills: 11 | Status: CANCELLED | OUTPATIENT
Start: 2019-09-16

## 2019-09-16 NOTE — TELEPHONE ENCOUNTER
----- Message from Primo Merino sent at 9/16/2019  1:50 PM EDT -----  Regarding: Dr Lev Erickson (if not patient):      Relationship of caller (if not patient):      Best contact number(s): [phone]      Name of medication and dosage if known: Levetiracepam  mg      Is patient out of this medication (yes/no):yes      Pharmacy name:[facility]    Pharmacy listed in chart? (yes/no):yes  Pharmacy phone number:  [phone]    Details to clarify the request:      Primo Merino

## 2019-09-16 NOTE — TELEPHONE ENCOUNTER
levETIRAcetam (KEPPRA XR) 500 mg ER tablet 150 Tab 11 2019     Si tabs daily.  Same generic brand for each refill    Sent to pharmacy as: levETIRAcetam (KEPPRA XR) 500 mg ER tablet    E-Prescribing Status: Receipt confirmed by pharmacy (2019 12:01 PM EDT)      LVM for pt to call [facility] as she has plenty of refills remaining

## 2019-12-03 ENCOUNTER — TELEPHONE (OUTPATIENT)
Dept: NEUROLOGY | Age: 24
End: 2019-12-03

## 2019-12-03 NOTE — TELEPHONE ENCOUNTER
Patient wanted to let Dr. Johan Doe know she some spasms yesterday but not a full seizure. She hasn't had this happen in about 3 years. She didn't know if he would want to see her or order any tests.

## 2019-12-05 NOTE — TELEPHONE ENCOUNTER
----- Message from Shannan Wang sent at 12/5/2019  4:18 PM EST -----  Regarding: Dr Veloz/telephone  Patient return call    Caller's first and last name and relationship (if not the patient):      Best contact number(s): [phone]      Whose call is being returned:nurse      Details to clarify the request:she left several messages returning missed calls and have not heard back from anyone yet regarding very bad spasms while driving and memory issues, she has not been driving since she is have the spasms  she would like to know what she should do does she need an appt, she would like a call back from Dr Tiburcio Galicia directly       Shannan Wang

## 2019-12-05 NOTE — TELEPHONE ENCOUNTER
12/3/19 first time pt called, return call made with  requesting call back, no messages until today.     Returned pt call  M to call back

## 2019-12-10 ENCOUNTER — TELEPHONE (OUTPATIENT)
Dept: NEUROLOGY | Age: 24
End: 2019-12-10

## 2019-12-10 NOTE — TELEPHONE ENCOUNTER
S/w pt informed her that she needs to schedule OV as she has not been seen since May.   appt scheduled for Tuesday, December 17, 2019 01:40 PM

## 2019-12-10 NOTE — TELEPHONE ENCOUNTER
----- Message from Lea Vasquez sent at 12/10/2019 11:24 AM EST -----  Regarding: dr roe/ telephone  General Message/Vendor Calls    Caller's first and last name: pt      Reason for call: she wants to know does she need an appt or can the doctor tell her where to go for the labs requested some time last year       Callback required yes/no and why: yes      Best contact number(s): [phone]      Details to clarify the request: she wants the doctor to call her NOT the nurse and please leave a message with the details       Gene Quiñonez [id number]

## 2019-12-17 ENCOUNTER — OFFICE VISIT (OUTPATIENT)
Dept: NEUROLOGY | Age: 24
End: 2019-12-17

## 2019-12-17 VITALS
HEART RATE: 74 BPM | OXYGEN SATURATION: 98 % | HEIGHT: 65 IN | RESPIRATION RATE: 16 BRPM | WEIGHT: 137 LBS | SYSTOLIC BLOOD PRESSURE: 104 MMHG | BODY MASS INDEX: 22.82 KG/M2 | DIASTOLIC BLOOD PRESSURE: 64 MMHG

## 2019-12-17 DIAGNOSIS — G40.B09 NONINTRACTABLE JUVENILE MYOCLONIC EPILEPSY WITHOUT STATUS EPILEPTICUS (HCC): Primary | ICD-10-CM

## 2019-12-17 RX ORDER — LEVETIRACETAM 500 MG/1
TABLET, EXTENDED RELEASE ORAL
Qty: 180 TAB | Refills: 11 | Status: SHIPPED | OUTPATIENT
Start: 2019-12-17 | End: 2020-06-23 | Stop reason: SDUPTHER

## 2019-12-17 NOTE — PROGRESS NOTES
[facility] Neurology Clinics and [address] at [facility] Neurology Clinics at [place] [address] [facility], [address] [place] [address] [address] [facility], [address] [place] [address]   [phone]              Chief Complaint   Patient presents with   [facility] Seizure     f/u. Has not had seizure since around 2015.  had spasm of LA while driving recently that caused arm to lift off of steering wheel. only had that happen once before and had full seizure right after. this time she did not however, that morning she woke up feeling like she does postictal, very foggy. Last week right eye started having slight twitches. happens off and on. Current Outpatient Medications   Medication Sig Dispense Refill    levETIRAcetam (KEPPRA XR) 500 mg ER tablet 5 tabs daily. Same generic brand for each refill 150 Tab 11    DIAZEPAM (DIASTAT ACUDIAL RE) Insert  into rectum. 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 She is been quite stable over the years on her Keppra. She takes 5 of the 500 mg extended release tablets daily. She is had some difficulty with getting the medication due to the shortage. Her pills have changed several times and she confirmed that the pharmacy has been changing her brand. This is important because she is not had a seizure since 2015. On December 2 she was at the [address] She just felt a bit off. She felt postictal but she lives with her boyfriend and her boyfriend did not find her to have any type of seizure and she did not make any abnormal movements etc.  She just felt very tired.   She was in the drive-through getting some coffee and she had what is described as a huge myoclonic jerk of her left upper extremity. No loss or alteration in consciousness. She says that the only time she is ever had this is preceding a convulsion. It frightened her. She has not been driving since then. She has been compliant with her medicine. No perceived side effect. No occult seizure symptom. No awakening with her tongue being bed etc.  She is obviously depressed. Not been ill. No chest pain. No shortness of breath. No nausea. No vomiting. Eating well. Sleeping well. Review of systems  Pertinent positives and negatives as noted with remainder of comprehensive review negative    Examination  Visit Vitals  /64 (BP 1 Location: Left arm, BP Patient Position: Sitting)   Pulse 74   Resp 16   Ht 5' 5\" (1.651 m)   Wt 62.1 kg (137 lb)   SpO2 98%   BMI 22.80 kg/m²     Awake, alert and oriented. No icterus. CN intact 2-12 without nystagmus. No pronation or drift. Resists fully in all 4 extrems. DTR symmetric in all 4 extremities. No ataxia. Steady gait. Impression/Plan  Pleasant young woman with juvenile myoclonic epilepsy with what sounds like myoclonic jerk and concerning part is that this always precedes a convulsion but she is not had a convulsion and has not had any loss or alteration in consciousness. Therefore she does not fall into the driving restriction but I agree with her being precautions. Increase Keppra to a dose of 6 tablets of the extended release preparation nightly Hedger Sanjeev. Get an EEG looking for any discharges. I will see her the day of the EEG. Amina Cochran MD      This note was created using voice recognition software. Despite editing, there may be syntax errors. This note will not be viewable in [address]

## 2019-12-18 ENCOUNTER — OFFICE VISIT (OUTPATIENT)
Dept: NEUROLOGY | Age: 24
End: 2019-12-18

## 2019-12-18 VITALS
WEIGHT: 136.91 LBS | RESPIRATION RATE: 16 BRPM | OXYGEN SATURATION: 99 % | HEART RATE: 74 BPM | SYSTOLIC BLOOD PRESSURE: 92 MMHG | BODY MASS INDEX: 22.81 KG/M2 | DIASTOLIC BLOOD PRESSURE: 68 MMHG | HEIGHT: 65 IN

## 2019-12-18 DIAGNOSIS — G40.B09 NONINTRACTABLE JUVENILE MYOCLONIC EPILEPSY WITHOUT STATUS EPILEPTICUS (HCC): Primary | ICD-10-CM

## 2019-12-18 NOTE — PROGRESS NOTES
[facility] Neurology Clinics and [address] at [facility] Neurology Clinics at [address] [facility], [address] [facility] [address] [facility], [address] [place] [address]   [phone]              No chief complaint on file. Current Outpatient Medications   Medication Sig Dispense Refill    levETIRAcetam (KEPPRA XR) 500 mg ER tablet 6 tabs daily. 180 Tab 11    DIAZEPAM (DIASTAT ACUDIAL RE) Insert  into rectum. Allergies   Allergen Reactions    Bactrim [Sulfamethoprim] Anaphylaxis and Rash     Social History     Tobacco Use    Smoking status: Never Smoker    Smokeless tobacco: Never Used   Substance Use Topics    Alcohol use: No    Drug use: Not on file     Patient returns today for follow-up of juvenile myoclonic epilepsy. When I saw her last she had had quite concerning for her. Her medication had changed a bit. We increased her Keppra to a dose of 3 g daily using the extended release preparation. She had an EEG morning. I just reviewed that. This was normal.  No generalized discharges. Her mother is with her today. Patient was quite concerned and nervous. We discussed her normal EEG. We discussed the higher dose of Keppra. We discussed heeding the warning that if she gets another myoclonic jerk then she should try to get herself in a safe position. We discussed that she has not had a seizure that would restrict her driving. Answered hers and her mother's questions today regarding [place] lights etc.  I think is fine for her to go see [employer] lights. I probably would not go on the rides at [employer] with strobe lights etc. and we discussed all that. Discussed all of her questions and concerns. She was quite happy today.   We will go ahead and see her back in May as scheduled and if she is doing well at that point we will stretch her back out to yearly appointments    Total time: 15 min   Counseling / coordination time: 15 min   > 50% counseling / coordination?: Yes re: as documented above        Examination  Visit Vitals  BP 92/68 (BP 1 Location: Left arm, BP Patient Position: Sitting)   Pulse 74   Resp 16   Ht 5' 5\" (1.651 m)   Wt 62.1 kg (136 lb 14.5 oz)   SpO2 99%   BMI 22.78 kg/m²         Impression/Plan      Alma Quiñones MD      This note was created using voice recognition software. Despite editing, there may be syntax errors. This note will not be viewable in [address]

## 2019-12-18 NOTE — PROCEDURES
EEG:      Date:  12/18/19    Requesting Physician:  Alvina Weiner. MD Roselia    An EEG is requested in this 27year old young woman with juvenile myoclonic epilepsy to evaluate for epileptiform abnormality. Medications:  Medications said to include Keppra and Valium. This tracing is obtained during the awake and drowsy states. During wakefulness there are brief intermittent runs of posteriorly-dominant and symmetrical low-to-medium amplitude 10 cycle per second activities which attenuate with eye opening. Lower-voltage faster-frequency activities are seen symmetrically over the anterior head regions. Hyperventilation is performed for three minutes and little alters the tracing. Intermittent photic stimulation little alters the tracing. During drowsiness the background rhythms attenuate and are replaced with diffuse symmetric theta range activities. Later stages of sleep are not attained. Interpretation: This EEG recorded during the awake state is normal.  No epileptiform abnormalities are seen.

## 2020-03-15 ENCOUNTER — ED HISTORICAL/CONVERTED ENCOUNTER (OUTPATIENT)
Dept: OTHER | Age: 25
End: 2020-03-15

## 2020-05-20 ENCOUNTER — TELEPHONE (OUTPATIENT)
Dept: NEUROLOGY | Age: 25
End: 2020-05-20

## 2020-05-20 NOTE — TELEPHONE ENCOUNTER
Called patient back to schedule a VV appt with Dr. Estela Graham, unable to leave a message because voicemail box is not set up. Can do VV tomorrow afternoon.

## 2020-05-20 NOTE — TELEPHONE ENCOUNTER
----- Message from Karen Correa sent at 5/20/2020 11:16 AM EDT -----  Regarding: Dr Alex Shaw first and last name:      Reason for call:pt updated her information with new address and phone number  and insurance info,needs to r/s her 5/19/2020 appt said she is pregnant and does not mind doing a virtual visit , and like to looking to getting lab order for previous tests she had in the past       Callback required yes/no and why:yes for reason given above       Best contact number(s):[phone], pt gave permission to leave a detailed message      Details to clarify the request:she can use the to be link for her virtual visit       Karen Correa

## 2020-05-21 NOTE — TELEPHONE ENCOUNTER
----- Message from Radha Quiñonez sent at 5/20/2020 11:16 AM EDT -----  Regarding: Dr Ashley Weiss first and last name:      Reason for call:pt updated her information with new address and phone number  and insurance info,needs to r/s her 5/19/2020 appt said she is pregnant and does not mind doing a virtual visit , and like to looking to getting lab order for previous tests she had in the past       Callback required yes/no and why:yes for reason given above       Best contact number(s):[phone], pt gave permission to leave a detailed message      Details to clarify the request:she can use the [employer] link for her virtual visit       Radha Quiñonez

## 2020-06-23 ENCOUNTER — OFFICE VISIT (OUTPATIENT)
Dept: NEUROLOGY | Age: 25
End: 2020-06-23

## 2020-06-23 VITALS
RESPIRATION RATE: 18 BRPM | HEIGHT: 65 IN | DIASTOLIC BLOOD PRESSURE: 74 MMHG | SYSTOLIC BLOOD PRESSURE: 106 MMHG | OXYGEN SATURATION: 96 % | BODY MASS INDEX: 21.99 KG/M2 | HEART RATE: 103 BPM | WEIGHT: 132 LBS | TEMPERATURE: 99 F

## 2020-06-23 DIAGNOSIS — Z51.81 MEDICATION MONITORING ENCOUNTER: ICD-10-CM

## 2020-06-23 DIAGNOSIS — G40.B09 NONINTRACTABLE JUVENILE MYOCLONIC EPILEPSY WITHOUT STATUS EPILEPTICUS (HCC): Primary | ICD-10-CM

## 2020-06-23 DIAGNOSIS — Z3A.12 12 WEEKS GESTATION OF PREGNANCY: ICD-10-CM

## 2020-06-23 DIAGNOSIS — G40.B09 NONINTRACTABLE JUVENILE MYOCLONIC EPILEPSY WITHOUT STATUS EPILEPTICUS (HCC): ICD-10-CM

## 2020-06-23 RX ORDER — LEVETIRACETAM 500 MG/1
TABLET, EXTENDED RELEASE ORAL
Qty: 180 TAB | Refills: 11 | Status: SHIPPED | OUTPATIENT
Start: 2020-06-23 | End: 2021-07-16

## 2020-06-23 NOTE — PROGRESS NOTES
Chief Complaint   Patient presents with    Seizure     Pt currently pregnant GILBERTO 1/5/21, OB is ailin Denny    Last known sz was appr 5 yrs ago    Pt had Keppra levels drawn during last OB visit.   Dr. Minor Mcduffie suggested that if Dr. Sajan Li needed lab drawn to send order to them and they may include with their labs so pt does not have to have multiple draws

## 2020-06-23 NOTE — LETTER
6/23/20 Patient: Aníbal Garber YOB: 1995 Date of Visit: 6/23/2020 Chayo Catherine MD 
[address] [address] VIA Facsimile: [phone] Dear Chayo Catherine MD, Thank you for referring Ms. Geraldine Montgomery to [facility] for evaluation. My notes for this consultation are attached. If you have questions, please do not hesitate to call me. I look forward to following your patient along with you. Sincerely, Jin Vasquez MD

## 2020-06-23 NOTE — PROGRESS NOTES
[facility] Neurology Clinics and [address] at [facility] Neurology Clinics at [address] [facility], [address] [place] [address] [facility], [address] [place] [address]   [phone]              Chief Complaint   Patient presents with    Seizure     Current Outpatient Medications   Medication Sig Dispense Refill    levETIRAcetam (KEPPRA XR) 500 mg ER tablet 6 tabs daily. 180 Tab 11    DIAZEPAM (DIASTAT ACUDIAL RE) Insert  into rectum. Allergies   Allergen Reactions    Bactrim [Sulfamethoprim] Anaphylaxis and Rash     Social History     Tobacco Use    Smoking status: Never Smoker    Smokeless tobacco: Never Used   Substance Use Topics    Alcohol use: No    Drug use: Not on file     Patient returns today for follow-up epilepsy, juvenile myoclonic. She has been doing well. She has been on Keppra. Her last visit with me was December. Her last EEG was unremarkable. She comes today pregnant. She maintains her 3 g of Keppra daily. She is 3 months pregnant. Dr. MISTRY [facility] is her OB. She did have a Keppra level drawn 528 and that was 38.5. She remains seizure-free. No myoclonic jerks. Baby is doing well. She has an ultrasound appointment today. Examination  Visit Vitals  /74 (BP 1 Location: Left arm, BP Patient Position: Sitting)   Pulse (!) 103   Temp 99 °F (37.2 °C)   Resp 18   Ht 5' 5\" (1.651 m)   Wt 59.9 kg (132 lb)   SpO2 96%   BMI 21.97 kg/m²   Very pleasant lady. Awake alert oriented to person. Normal speech and  language. Normal cognition. No ataxia. Full versions. No nystagmus. Impression/Plan  Juvenile myoclonic epilepsy controlled on 3 g of Keppra daily. She is using the same generic and do that. Discussed epilepsy and pregnancy and the most prudent and important thing to do is to remain seizure-free.   Discussed the risk of increased complications in ladies with epilepsy and with medications although we discussed Keppra is considered to be 1 of the safer medications in pregnancy. Continue our same dose. I like to see her every couple of months through the pregnancy. Would like to get Keppra levels about every month or so just to see where she is a make sure that with fluid shifts etc. and changes in metabolism during pregnancy we do not drop. I gave her a lab slip and Dr. Amandeep Norht has graciously told her that she can get her Keppra level drawn with her routine OB labs to minimize sticks. I am fine with getting another level in July and I will see her in August.  Certainly if she has any issues prior to that appointment she will call. We will make these visits virtual visits to help facilitate her not having to drive the distance and take off from work    Brice Vargas MD      This note was created using voice recognition software. Despite editing, there may be syntax errors. This note will not be viewable in [address]       Total time: 25 min   Counseling / coordination time: 15 min   > 50% counseling / coordination?: Yes re: as documented above

## 2020-08-25 ENCOUNTER — VIRTUAL VISIT (OUTPATIENT)
Dept: NEUROLOGY | Age: 25
End: 2020-08-25
Payer: COMMERCIAL

## 2020-08-25 DIAGNOSIS — G40.B09 NONINTRACTABLE JUVENILE MYOCLONIC EPILEPSY WITHOUT STATUS EPILEPTICUS (HCC): Primary | ICD-10-CM

## 2020-08-25 DIAGNOSIS — Z51.81 MEDICATION MONITORING ENCOUNTER: ICD-10-CM

## 2020-08-25 DIAGNOSIS — Z3A.20 20 WEEKS GESTATION OF PREGNANCY: ICD-10-CM

## 2020-08-25 PROCEDURE — 99212 OFFICE O/P EST SF 10 MIN: CPT | Performed by: NURSE PRACTITIONER

## 2020-08-25 RX ORDER — PANTOPRAZOLE SODIUM 40 MG/1
TABLET, DELAYED RELEASE ORAL
COMMUNITY
Start: 2020-08-11 | End: 2021-08-31

## 2020-08-25 RX ORDER — DOXYLAMINE SUCCINATE AND PYRIDOXINE HYDROCHLORIDE 20; 20 MG/1; MG/1
TABLET, EXTENDED RELEASE ORAL
COMMUNITY
Start: 2020-08-12 | End: 2021-08-31

## 2020-08-25 RX ORDER — ONDANSETRON 4 MG/1
TABLET, ORALLY DISINTEGRATING ORAL
COMMUNITY
Start: 2020-08-11 | End: 2021-08-31

## 2020-08-25 NOTE — PROGRESS NOTES
Inderjit Piña is a 23 y.o. female who was seen by synchronous (real-time) audio-video technology on 8/25/2020 for Follow-up and Epilepsy        Assessment & Plan:   Diagnoses and all orders for this visit:    1. Nonintractable juvenile myoclonic epilepsy without status epilepticus ([address]    2. Medication monitoring encounter    3. 20 weeks gestation of pregnancy      Non-intractable juvenile myoclonic epilepsy which is stable on present therapy of Keppra Exar 3000 mg daily. She is currently 20 weeks gestation of pregnancy and doing well overall except for persistent morning sickness. When she goes for her follow-ups to get her gallbladder ultrasound results she will be getting all of her other lab work done which will include the [address] level. We will recheck this again in another 2 months with follow-up at that time. Subjective: Follow-up for epilepsy well pregnant. She is currently 5 months pregnant. Since she was last seen, she has been having issues with morning sickness. She was sent for an US due to possible gallbladder involvement. She goes for follow-up to get those results and will be getting her Keppra level drawn at that point. In the meantime, she has not had any seizures. She continues taking Keppra XR 3000 mg daily. She has not had any side effects or signs of toxicity on this medication. Her only question is is whether or not it would be safe for her to breast-feed once the baby is here while on this medication. Prior to Admission medications    Medication Sig Start Date End Date Taking?  Authorizing Provider   Bonjesta 20-20 mg TbID TAKE 1 BY MOUTH ONCE DAILY AT BEDTIME ON AN EMPTY STOMACH FOR 30 DAYS 8/12/20  Yes Provider, Historical   ondansetron (ZOFRAN ODT) 4 mg disintegrating tablet DISSOLVE 1 TABLET IN MOUTH ONCE DAILY AS NEEDED FOR 30 DAYS 8/11/20  Yes Provider, Historical   pantoprazole (PROTONIX) 40 mg tablet TAKE 1 TABLET BY MOUTH ONCE DAILY FOR 30 DAYS 8/11/20  Yes Provider, Historical   levETIRAcetam (KEPPRA XR) 500 mg ER tablet 6 tabs daily. 6/23/20  Yes Leedey, Torie Kemp MD   DIAZEPAM (DIASTAT ACUDIAL RE) Insert  into rectum. Other, MD Moris         ROS    Objective:     Patient-Reported Vitals 8/25/2020   Patient-Reported Weight 150   Patient-Reported Height 5'2   Patient-Reported LMP March 22nd        [INSTRUCTIONS:  \"[x]\" Indicates a positive item  \"[]\" Indicates a negative item  -- DELETE ALL ITEMS NOT EXAMINED]    Constitutional: [x] Appears well-developed and well-nourished [x] No apparent distress      [] Abnormal -     Mental status: [x] Alert and awake  [x] Oriented to person/place/time [x] Able to follow commands    [] Abnormal -     Eyes:   EOM    [x]  Normal    [] Abnormal -   Sclera  [x]  Normal    [] Abnormal -          Discharge [x]  None visible   [] Abnormal -     HENT: [x] Normocephalic, atraumatic  [] Abnormal -   [x] Mouth/Throat: Mucous membranes are moist    External Ears [x] Normal  [] Abnormal -    Neck: [x] No visualized mass [] Abnormal -     Pulmonary/Chest: [x] Respiratory effort normal   [x] No visualized signs of difficulty breathing or respiratory distress        [] Abnormal -      Musculoskeletal:   [x] Normal gait with no signs of ataxia         [x] Normal range of motion of neck        [] Abnormal -     Neurological:        [x] No Facial Asymmetry (Cranial nerve 7 motor function) (limited exam due to video visit)          [x] No gaze palsy        [] Abnormal -          Skin:        [x] No significant exanthematous lesions or discoloration noted on facial skin         [] Abnormal -            Psychiatric:       [x] Normal Affect [] Abnormal -        [x] No Hallucinations    Other pertinent observable physical exam findings:-        We discussed the expected course, resolution and complications of the diagnosis(es) in detail. Medication risks, benefits, costs, interactions, and alternatives were discussed as indicated.   I advised her to contact the office if her condition worsens, changes or fails to improve as anticipated. She expressed understanding with the diagnosis(es) and plan. Cyndi Dai, who was evaluated through a patient-initiated, synchronous (real-time) audio-video encounter, and/or her healthcare decision maker, is aware that it is a billable service, with coverage as determined by her insurance carrier. She provided verbal consent to proceed: Yes, and patient identification was verified. It was conducted pursuant to the emergency declaration under the [address] [place] [address], [address] [place] [address] authority and the [employer] Act. A caregiver was present when appropriate. Ability to conduct physical exam was limited. I was in the office. The patient was at home.       Kasia Gore NP

## 2020-09-08 ENCOUNTER — OFFICE VISIT (OUTPATIENT)
Dept: HEMATOLOGY | Age: 25
End: 2020-09-08
Payer: COMMERCIAL

## 2020-09-08 VITALS
BODY MASS INDEX: 25.33 KG/M2 | WEIGHT: 152 LBS | HEART RATE: 95 BPM | TEMPERATURE: 97.6 F | OXYGEN SATURATION: 99 % | HEIGHT: 65 IN | RESPIRATION RATE: 16 BRPM | DIASTOLIC BLOOD PRESSURE: 67 MMHG | SYSTOLIC BLOOD PRESSURE: 98 MMHG

## 2020-09-08 DIAGNOSIS — K76.0 FATTY LIVER: Primary | ICD-10-CM

## 2020-09-08 PROBLEM — G40.909 SEIZURE DISORDER (HCC): Status: ACTIVE | Noted: 2020-09-08

## 2020-09-08 PROBLEM — Z34.90 PREGNANCY: Status: ACTIVE | Noted: 2020-09-08

## 2020-09-08 PROCEDURE — 99203 OFFICE O/P NEW LOW 30 MIN: CPT | Performed by: INTERNAL MEDICINE

## 2020-09-08 NOTE — PROGRESS NOTES
[address] [facility], Rosy LEVINE Matilde Ranger, MD Darron Bayley, KEISHA Sifuentes, [facility]     Naomi Fish, [facility]   Dylan Smith P-MAGUE Reynolds, [facility]       Stanley Douglas De Ortiz 136    at [address], [facility] Zenaida Alicia  22.    [phone]    FAX: [address] [place] [address], [address]    [phone]    FAX: [phone]       Patient Care Team:  Nisha Rudd MD as PCP - General (Pediatric Medicine)  Nisha Rudd MD (Pediatric Medicine)  Sergio Musa MD (Neurology)  Kirsty Leung NP (Nurse Practitioner)  Luke Krishnan NP (Neurology)  Dawit Stone MD (Obstetrics & Gynecology)      Problem List  Date Reviewed: 12/18/2019          Codes Class Noted    Seizure disorder [facility]) ICD-10-CM: G40.909  ICD-9-CM: 345.90  9/8/2020        Pregnancy ICD-10-CM: Z34.90  ICD-9-CM: V22.2  9/8/2020        Fatty liver ICD-10-CM: K76.0  ICD-9-CM: 571.8  9/8/2020              The clinicians listed above have asked me to see Inderjit Section in consultation regarding suspected fatty liver disease and its management. All medical records sent by the referring physicians were reviewed including imaging studies     The patient is a 23 y.o.  female who is suspected to have fatty liver disease based upon ultrasound perfomred for evalaution of hyperemesis during pregnancy. She is currently at week 23 of her first pregnancy     Serologic evaluation for markers of chronic liver disease was negative for HCV. The most recent imaging of the liver was Ultrasound performed in 8/2020. Results suggest fatty liver disease.       An assessment of liver fibrosis with biopsy or elastography has not been performed. The patient has the following symptoms which are thought to be due to the liver disease:  nausea, vomiting    The patient is not currently experiencing the following symptoms of liver disease:  fatigue, pain in the right side over the liver,     The patient completes all daily activities without any functional limitations. ASSESSMENT AND PLAN:  Fatty liver  Suspect the patient has fatty liver based upon imaging,   There are no risk factors for fatty liver. No features of metabolic syndrome and she is normal BMI  She may have developed fatty liver from hyperemesis and decreased calorie intake. She may have another form of liver disease that is being suppressed by immune suppressive state of pregnancy. Liver transaminases are normal.  ALP is normal.  Liver function is normal.  The platelet count is normal.      Fatty liver, even if she has this, does not lead to fatty liver of pregnancy. She does not have fatty liver of pregnancy. This occurs near the end of the third trimester. Will have her return in the middle of the third trimester just for monitoring of this. [address] is a very safe anti-seizure medication and is preferred in patients with liver disease because it is not metabolized by the liver. Keppra does not cause fatty liver. Serologic testing for causes of chronic liver disease were negative for HCV, HBV   Will perform additional serologic tests to screen for other causes of chronic liver disease if the liver enzymes increase after pregnancy. The need to perform an assessment of liver fibrosis was discussed with the patient. The Fibroscan can assess liver fibrosis and determine if a patient has advanced fibrosis or cirrhosis without the need for liver biopsy. This will be performed after the delivery of the baby. Screening for Hepatocellular Carcinoma  HCC screening is not necessary if the patient has no evidence of cirrhosis.     Treatment of other medical problems in patients with chronic liver disease  There are no contraindications for the patient to take most medications that are necessary for treatment of other medical issues. Counseling for alcohol in patients with chronic liver disease  The patient was counseled regarding alcohol consumption and the effect of alcohol on chronic liver disease. The patient does not consume any significant amount of alcohol. Vaccinations   The need for vaccination against viral hepatitis A and B will be assessed with serologic and instituted as appropriate. Routine vaccinations against other bacterial and viral agents can be performed as indicated. Annual flu vaccination should be administered if indicated. ALLERGIES  Allergies   Allergen Reactions    Bactrim [Sulfamethoprim] Anaphylaxis and Rash       MEDICATIONS  Current Outpatient Medications   Medication Sig    Bonjesta 20-20 mg TbID TAKE 1 BY MOUTH ONCE DAILY AT BEDTIME ON AN EMPTY STOMACH FOR 30 DAYS    ondansetron (ZOFRAN ODT) 4 mg disintegrating tablet DISSOLVE 1 TABLET IN MOUTH ONCE DAILY AS NEEDED FOR 30 DAYS    pantoprazole (PROTONIX) 40 mg tablet TAKE 1 TABLET BY MOUTH ONCE DAILY FOR 30 DAYS    levETIRAcetam (KEPPRA XR) 500 mg ER tablet 6 tabs daily.  DIAZEPAM (DIASTAT ACUDIAL RE) Insert  into rectum. No current facility-administered medications for this visit. SYSTEM REVIEW NOT RELATED TO LIVER DISEASE OR REVIEWED ABOVE:  Constitution systems: Negative for fever, chills, weight gain, weight loss. Eyes: Negative for visual changes. ENT: Negative for sore throat, painful swallowing. Respiratory: Negative for cough, hemoptysis, SOB. Cardiology: Negative for chest pain, palpitations. GI:  Negative for constipation or diarrhea. : Negative for urinary frequency, dysuria, hematuria, nocturia. Skin: Negative for rash. Hematology: Negative for easy bruising, blood clots.     Musculo-skelatal: Negative for back pain, muscle pain, weakness. Neurologic: Negative for headaches, dizziness, vertigo, memory problems not related to HE. Psychology: Negative for anxiety, depression. FAMILY HISTORY:  The patient has no knowledge of the father's medical condition. The mother Has/had the following chronic disease(s): None. There is no family history of liver disease. SOCIAL HISTORY:  The patient has never been , but has steady partner  The patient has no children. This is the first pregnancy  The patient has never used tobacco products. The patient has previously consumed alcohol socially never in excess. The patient has been abstinent from alcohol since 12/2019. The patient currently works full time as         PHYSICAL EXAMINATION:  VS: per nursing note  General: No acute distress. Eyes: Sclera anicteric. ENT: No oral lesions. Thyroid normal.  Nodes: No adenopathy. Skin: No spider angiomata. No jaundice. No palmar erythema. Respiratory: Lungs clear to auscultation. Cardiovascular: Regular heart rate. No murmurs. No JVD. Abdomen: Soft non-tender, liver size normal to percussion/palpation. Spleen not palpable. No obvious ascites. Extremities: No edema. No muscle wasting. No gross arthritic changes. Neurologic: Alert and oriented. Cranial nerves grossly intact. No asterixis. LABORATORY STUDIES:  From 8/2020  AST/ALT/ALP/T Bili/ALB:  22/26/58/0.2/3.5  WBC/HB/PLT/INR:  10.6/10.7/224  NA/BUN/CREAT:  5/0.5    SEROLOGIES:  5/2020. HBsurface antigen negative, anti-HCV negative    LIVER HISTOLOGY:  Not available or performed    ENDOSCOPIC PROCEDURES:  Not available or performed    RADIOLOGY:  8/2020. Ultrasound of liver. Echogenic consistent with fatty liver. No liver mass lesions. No dilated bile ducts. No ascites.     OTHER TESTING:  Not available or performed    FOLLOW-UP:  All of the issues listed above in the Assessment and Plan were discussed with the patient. All questions were answered. The patient expressed a clear understanding of the above. [address] [place] [address] in 6 weeks for routine monitoring.       Italia Lloyd MD  [facility] [address] [place] [address], [address] [place] [address] [place]  22.  [phone]  [address]

## 2020-09-08 NOTE — Clinical Note
9/12/20 Patient: Jenifer Chaidez YOB: 1995 Date of Visit: 9/8/2020 Mick Sosa MD 
[facility] [place], [facility] [address] [facility] [address] VIA Facsimile: [phone] Susie Nick MD 
[address] [address] [place] [address] VIA Facsimile: [phone] Dear MD Susie Camara MD, Thank you for referring Ms. Corrie Galicia to [address] Lisseth Hein for evaluation. My notes for this consultation are attached. If you have questions, please do not hesitate to call me. I look forward to following your patient along with you. Sincerely, Andrews Bryan MD

## 2020-09-08 NOTE — PROGRESS NOTES
Identified pt with two pt identifiers(name and ). Reviewed record in preparation for visit and have obtained necessary documentation. Chief Complaint   Patient presents with    New Patient    Elevated Liver Enzymes      Vitals:    20 0828   BP: 98/67   Pulse: 95   Resp: 16   Temp: 97.6 °F (36.4 °C)   TempSrc: Temporal   SpO2: 99%   Weight: 152 lb (68.9 kg)   Height: 5' 5\" (1.651 m)   PainSc:   0 - No pain       Health Maintenance Review: Patient reminded of \"due or due soon\" health maintenance. I have asked the patient to contact his/her primary care provider (PCP) for follow-up on his/her health maintenance. Coordination of Care Questionnaire:  :   1) Have you been to an emergency room, urgent care, or hospitalized since your last visit? If yes, where when, and reason for visit? no       2. Have seen or consulted any other health care provider since your last visit? If yets, where when, and reason for visit? YES  - neurologist    Patient is accompanied by self I have received verbal consent from Dearl Doni to discuss any/all medical information while they are present in the room.

## 2020-10-22 ENCOUNTER — VIRTUAL VISIT (OUTPATIENT)
Dept: HEMATOLOGY | Age: 25
End: 2020-10-22
Payer: COMMERCIAL

## 2020-10-22 DIAGNOSIS — K76.0 FATTY LIVER: Primary | ICD-10-CM

## 2020-10-22 PROCEDURE — 99213 OFFICE O/P EST LOW 20 MIN: CPT | Performed by: INTERNAL MEDICINE

## 2020-10-22 NOTE — PROGRESS NOTES
VIRTUAL TELEHEALTH VISIT PERFORMED DUE TO COVID-19 EPIDEMIC    CONSENT:  Kimberly Bautista, who was seen by synchronous, real-time, audio-video technology, and/or her healthcare decision maker, is aware that this patient-initiated, Telehealth encounter on 10/22/2020 is a billable service, with coverage as determined by her insurance carrier. She is aware that she may receive a bill and has provided verbal consent to proceed. This patient was evaluated during a Virtual Telehealth visit. A caregiver was present if appropriate.  Due to this being a TeleHealth encounter performed during the HNY-50 public health emergency, the physical examination was limited to that listed in the [address] [place] [address], MD, Ventura murray, Herrera Aviles MD      Hope Post, KEISHA Dawkins, [facility]     April S Abe, [facility]   Beau Westbrook, [facility] [address], [facility]       [facility] 136    at [address] 22.    [phone]    FAX: [address], [address]    [phone]    FAX: [phone]       Patient Care Team:  Joann Evans MD as PCP - General (Pediatric Medicine)  Joann Evans MD (Pediatric Medicine)  Cee Collazo MD (Neurology)  Elisa Bettencourt NP (Nurse Practitioner)  Simeon Aldrich NP (Neurology)  Silvano Carvalho MD (Obstetrics & Gynecology)      Problem List  Date Reviewed: 9/12/2020          Codes Class Noted    Seizure disorder [facility]) ICD-10-CM: G40.909  ICD-9-CM: 345.90  9/8/2020        Pregnancy ICD-10-CM: Z34.90  ICD-9-CM: V22.2  9/8/2020        Fatty liver ICD-10-CM: K76.0  ICD-9-CM: 571.8  9/8/2020              Kimberly Bautista is being seen at [facility] for management of fatty liver. The active problem list, all pertinent past medical history, medications, radiologic findings and laboratory findings related to the liver disorder were reviewed with the patient. The patient is a 23 y.o.  female who is suspected to have fatty liver disease based upon ultrasound perfomred for evalaution of hyperemesis during pregnancy. She is currently at week 23 of her first pregnancy     Serologic evaluation for markers of chronic liver disease was negative for HCV. The most recent imaging of the liver was Ultrasound performed in 2020. Results suggest fatty liver disease. An assessment of liver fibrosis with biopsy or elastography has not been performed. The patient has the following symptoms which are thought to be due to the liver disease:  nausea, vomiting    The patient is not currently experiencing the following symptoms of liver disease:  fatigue, pain in the right side over the liver,     The patient completes all daily activities without any functional limitations. She had labs had the OB and tells me all the liver enzymes are normal.    She is now being seen once a week for pre-luisito care. ASSESSMENT AND PLAN:  Fatty liver  Suspect the patient has fatty liver based upon imaging,   There are no risk factors for fatty liver. No features of metabolic syndrome and she is normal BMI  She may have developed fatty liver from hyperemesis and decreased calorie intake. She may have another form of liver disease that is being suppressed by immune suppressive state of pregnancy. Liver transaminases are normal.  ALP is normal.  Liver function is normal.  The platelet count is normal.      Fatty liver, even if she has this, does not lead to fatty liver of pregnancy. She does not have fatty liver of pregnancy. This occurs near the end of the third trimester.   Will have her return in the middle of the third trimester just for monitoring of this. Debe Barthel is a very safe anti-seizure medication and is preferred in patients with liver disease because it is not metabolized by the liver. Keppra does not cause fatty liver. Serologic testing for causes of chronic liver disease were negative for HCV, HBV   Will perform additional serologic tests to screen for other causes of chronic liver disease if the liver enzymes increase after pregnancy. The need to perform an assessment of liver fibrosis was discussed with the patient. The Fibroscan can assess liver fibrosis and determine if a patient has advanced fibrosis or cirrhosis without the need for liver biopsy. This will be performed after the delivery of the baby. Screening for Hepatocellular Carcinoma  HCC screening is not necessary if the patient has no evidence of cirrhosis. Treatment of other medical problems in patients with chronic liver disease  There are no contraindications for the patient to take most medications that are necessary for treatment of other medical issues. Counseling for alcohol in patients with chronic liver disease  The patient was counseled regarding alcohol consumption and the effect of alcohol on chronic liver disease. The patient does not consume any significant amount of alcohol. Vaccinations   The need for vaccination against viral hepatitis A and B will be assessed with serologic and instituted as appropriate. Routine vaccinations against other bacterial and viral agents can be performed as indicated. Annual flu vaccination should be administered if indicated.       ALLERGIES  Allergies   Allergen Reactions    Bactrim [Sulfamethoprim] Anaphylaxis and Rash       MEDICATIONS  Current Outpatient Medications   Medication Sig    Bonjesta 20-20 mg TbID TAKE 1 BY MOUTH ONCE DAILY AT BEDTIME ON AN EMPTY STOMACH FOR 30 DAYS    ondansetron (ZOFRAN ODT) 4 mg disintegrating tablet DISSOLVE 1 TABLET IN MOUTH ONCE DAILY AS NEEDED FOR 30 DAYS    pantoprazole (PROTONIX) 40 mg tablet TAKE 1 TABLET BY MOUTH ONCE DAILY FOR 30 DAYS    levETIRAcetam (KEPPRA XR) 500 mg ER tablet 6 tabs daily.  DIAZEPAM (DIASTAT ACUDIAL RE) Insert  into rectum. No current facility-administered medications for this visit. SYSTEM REVIEW NOT RELATED TO LIVER DISEASE OR REVIEWED ABOVE:  Constitution systems: Negative for fever, chills, weight gain, weight loss. Eyes: Negative for visual changes. ENT: Negative for sore throat, painful swallowing. Respiratory: Negative for cough, hemoptysis, SOB. Cardiology: Negative for chest pain, palpitations. GI:  Negative for constipation or diarrhea. : Negative for urinary frequency, dysuria, hematuria, nocturia. Skin: Negative for rash. Hematology: Negative for easy bruising, blood clots. Musculo-skelatal: Negative for back pain, muscle pain, weakness. Neurologic: Negative for headaches, dizziness, vertigo, memory problems not related to HE. Psychology: Negative for anxiety, depression. FAMILY HISTORY:  The patient has no knowledge of the father's medical condition. The mother Has/had the following chronic disease(s): None. There is no family history of liver disease. SOCIAL HISTORY:  The patient has never been , but has steady partner  The patient has no children. This is the first pregnancy  The patient has never used tobacco products. The patient has previously consumed alcohol socially never in excess. The patient has been abstinent from alcohol since 12/2019. The patient currently works full time as       LABORATORY STUDIES:  From 8/2020  AST/ALT/ALP/T Bili/ALB:  22/26/58/0.2/3.5  WBC/HB/PLT/INR:  10.6/10.7/224  NA/BUN/CREAT:  5/0.5    SEROLOGIES:  5/2020. HBsurface antigen negative, anti-HCV negative    LIVER HISTOLOGY:  Not available or performed    ENDOSCOPIC PROCEDURES:  Not available or performed    RADIOLOGY:  8/2020. Ultrasound of liver. Echogenic consistent with fatty liver. No liver mass lesions. No dilated bile ducts. No ascites. OTHER TESTING:  Not available or performed    FOLLOW-UP AFTER VIRTUAL VISIT:  Pursuant to the emergency declaration under the [facility] [place] [address] waiver authority and the [employer] Act, this Virtual  Visit was conducted, with the patient's (and/or their legal guardian's) consent, to reduce the patient's risk of exposure to COVID-19 and provide necessary medical care. Services were provided through a video synchronous discussion virtually to substitute for an in-person clinic visit. The patient was located in their home. The provider was located in the [facility] office. All of the issues listed above in the Assessment and Plan were discussed with the patient. All questions were answered. The patient expressed a clear understanding of the above. Because of the COVID-19 epidemic a follow-up appointment will be performed via TeleHealth in 4-6 weeks   for routine monitoring.       Jostin Anthony MD  [address] 22.  [phone]  [address] [place] [address]

## 2020-10-23 ENCOUNTER — TELEPHONE (OUTPATIENT)
Dept: HEMATOLOGY | Age: 25
End: 2020-10-23

## 2020-10-23 NOTE — TELEPHONE ENCOUNTER
----- Message from Grisel Mead MD sent at 10/22/2020  4:41 PM EDT -----  Regarding: Schedule FU appt in mid-December with JOSE

## 2021-07-16 ENCOUNTER — TELEPHONE (OUTPATIENT)
Dept: NEUROLOGY | Age: 26
End: 2021-07-16

## 2021-07-16 DIAGNOSIS — G40.B09 NONINTRACTABLE JUVENILE MYOCLONIC EPILEPSY WITHOUT STATUS EPILEPTICUS (HCC): ICD-10-CM

## 2021-07-16 RX ORDER — LEVETIRACETAM 500 MG/1
TABLET, EXTENDED RELEASE ORAL
Qty: 180 TABLET | Refills: 0 | Status: SHIPPED | OUTPATIENT
Start: 2021-07-16 | End: 2021-08-25 | Stop reason: SDUPTHER

## 2021-07-16 NOTE — TELEPHONE ENCOUNTER
Telephone call from patient requesting a refill [place] states that she is out wants to know if she can get a refill until she is able to get an appointment made

## 2021-07-16 NOTE — TELEPHONE ENCOUNTER
levETIRAcetam (KEPPRA XR) 500 mg ER tablet 180 Tablet 0 7/16/2021     Sig: TAKE 6 TABLETS BY MOUTH ONCE DAILY    Sent to pharmacy as: levETIRAcetam  mg tablet,extended release 24 hr (KEPPRA XR)    E-Prescribing Status: Receipt confirmed by pharmacy (7/16/2021 10:27 AM EDT)

## 2021-08-25 DIAGNOSIS — G40.B09 NONINTRACTABLE JUVENILE MYOCLONIC EPILEPSY WITHOUT STATUS EPILEPTICUS (HCC): ICD-10-CM

## 2021-08-25 RX ORDER — LEVETIRACETAM 500 MG/1
TABLET, EXTENDED RELEASE ORAL
Qty: 180 TABLET | Refills: 0 | Status: SHIPPED | OUTPATIENT
Start: 2021-08-25 | End: 2021-08-31 | Stop reason: SDUPTHER

## 2021-08-31 ENCOUNTER — OFFICE VISIT (OUTPATIENT)
Dept: NEUROLOGY | Age: 26
End: 2021-08-31
Payer: COMMERCIAL

## 2021-08-31 VITALS
SYSTOLIC BLOOD PRESSURE: 108 MMHG | HEIGHT: 65 IN | HEART RATE: 81 BPM | BODY MASS INDEX: 25.33 KG/M2 | RESPIRATION RATE: 14 BRPM | DIASTOLIC BLOOD PRESSURE: 80 MMHG | WEIGHT: 152 LBS

## 2021-08-31 DIAGNOSIS — G40.B09 NONINTRACTABLE JUVENILE MYOCLONIC EPILEPSY WITHOUT STATUS EPILEPTICUS (HCC): ICD-10-CM

## 2021-08-31 PROCEDURE — 99214 OFFICE O/P EST MOD 30 MIN: CPT | Performed by: PSYCHIATRY & NEUROLOGY

## 2021-08-31 RX ORDER — LEVETIRACETAM 500 MG/1
TABLET, EXTENDED RELEASE ORAL
Qty: 180 TABLET | Refills: 11 | Status: SHIPPED | OUTPATIENT
Start: 2021-08-31 | End: 2022-09-13 | Stop reason: SDUPTHER

## 2021-08-31 NOTE — PROGRESS NOTES
[facility] and [address] at [facility] at [address] [facility], [address] [facility] [address] [facility], [address] [place] [address]   [phone]              Chief Complaint   Patient presents with    Epilepsy     would like to know if sx are panic or anxiety related vs actual sz. In therapy once per week since giving birth in Dec 2020     Current Outpatient Medications   Medication Sig Dispense Refill    levETIRAcetam (KEPPRA XR) 500 mg ER tablet TAKE 6 TABLETS BY MOUTH ONCE DAILY 180 Tablet 0      Allergies   Allergen Reactions    Bactrim [Sulfamethoprim] Anaphylaxis and Rash     Social History     Tobacco Use    Smoking status: Never Smoker    Smokeless tobacco: Never Used   Substance Use Topics    Alcohol use: No    Drug use: Not on file   29-year-old lady returns today for follow-up. 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 She was last seen by her nurse practitioner Phyllis Ibrahim in August 2020 at that time she was 20 weeks gestation pregnancy and doing well. And at that time was for 2-month follow-up. Today she returns and notes she has not had any overt seizure. She has had some episodes where she was shaking both sides of her body but it happened during stressful times. She was completely alert. She has had some more minor episodes of shaking but again seems to be connected with panic attacks. She has been in therapy and she is going once a week and she is found that her anxiety is better and her panic attacks are better in these events are getting less frequent. She questions whether she may have outgrown her generalized epilepsy and can come off of medication. She notes that she had a successful pregnancy. Her daughter just turned 8 months. Record review finds visit with Dr. Mamta Wolff of the liver [place] where she was seen for fatty liver.   This was September of last year. Laboratory analyses 8/27/2020  Hepatitis C antibody negative  Comprehensive metabolic profile unremarkable  Keppra level 36    Examination  Visit Vitals  /80 (BP 1 Location: Left upper arm, BP Patient Position: Sitting, BP Cuff Size: Adult)   Pulse 81   Resp 14   Ht 5' 5\" (1.651 m)   Wt 68.9 kg (152 lb)   Breastfeeding Unknown   BMI 25.29 kg/m²     Awake, alert and oriented. No icterus. CN intact 2-12 without nystagmus. No pronation or drift. Resists fully in all 4 extrems. DTR symmetric in all 4 extremities. No ataxia. Steady gait. Impression/Plan  Pleasant 60-year-old lady with generalized epilepsy seizure-free for a number of years and question about coming off of Keppra. We discussed that we could potentially wean her slowly with risk of breakthrough seizure and subsequent risk of injury, injury to her child if she were bathing the child or carrying the child etc.  She asked about doing so in a controlled environment which was a great question and so we decided at this point to continue Keppra at the current dose. When she builds up some vacation time from work she will alert me and we will arrange for her to go into the epilepsy monitoring unit. My plan will be for her to decrease her Keppra by 1000 mg the Saturday and Sunday prior to entering on a Monday and then once she enters into the epilepsy monitoring unit have her dose decreased further as long as her EEG does not demonstrate generalized discharges.   I would then have her medications stop completely and have her monitored for reemergence of generalized discharges with activating procedures provocation etc.  If she remains seizure-free with a normal EEG then I think that would give us some relative assurance that she would likely be successful without medication versus if we see the resumption of generalized discharges then we know she would need to stay on meds    We will set her routine follow-up appointment for 1 year but she will call as above and we will schedule WENDY Pak MD    30 minutes today in chart review, reviewing her old EEGs, discussion as above as well as documentation    This note was created using voice recognition software. Despite editing, there may be syntax errors.

## 2022-02-10 DIAGNOSIS — K76.0 FATTY LIVER: Primary | ICD-10-CM

## 2022-03-18 PROBLEM — Z34.90 PREGNANCY: Status: ACTIVE | Noted: 2020-09-08

## 2022-03-19 PROBLEM — G40.909 SEIZURE DISORDER (HCC): Status: ACTIVE | Noted: 2020-09-08

## 2022-03-19 PROBLEM — K76.0 FATTY LIVER: Status: ACTIVE | Noted: 2020-09-08

## 2022-07-29 ENCOUNTER — TELEPHONE (OUTPATIENT)
Dept: NEUROLOGY | Age: 27
End: 2022-07-29

## 2022-07-29 NOTE — TELEPHONE ENCOUNTER
Patient called stating that she needs to get her dmv paperwork done by today since its due on the 31 of this month.

## 2022-08-10 NOTE — TELEPHONE ENCOUNTER
Pt's LOV was 8/31/21. Unable to fill out DMV form as they require LOV to be within 6 mo of form completion. Attempted to call pt, unable to LVM. [facility] msg sent that pt needs OV prior to having form filled out.

## 2022-08-18 ENCOUNTER — OFFICE VISIT (OUTPATIENT)
Dept: NEUROLOGY | Age: 27
End: 2022-08-18
Payer: COMMERCIAL

## 2022-08-18 VITALS
RESPIRATION RATE: 14 BRPM | HEART RATE: 65 BPM | DIASTOLIC BLOOD PRESSURE: 84 MMHG | BODY MASS INDEX: 27.77 KG/M2 | SYSTOLIC BLOOD PRESSURE: 124 MMHG | WEIGHT: 166.9 LBS

## 2022-08-18 DIAGNOSIS — G40.B09 NONINTRACTABLE JUVENILE MYOCLONIC EPILEPSY WITHOUT STATUS EPILEPTICUS (HCC): Primary | ICD-10-CM

## 2022-08-18 PROCEDURE — 99214 OFFICE O/P EST MOD 30 MIN: CPT | Performed by: NURSE PRACTITIONER

## 2022-08-19 NOTE — PROGRESS NOTES
Mala Weiner is a 24 y.o. female who presents with the following  Chief Complaint   Patient presents with    Epilepsy       HPI    FU epilepsy. She has not had a seizure since 2013   She is taking Keppra XR 6 tablets daily. She has missed a med here and there but boyfriend is helping her remember more now  No side effects  No twitching, jerking, convulsions   She is doing well  Working full time and at home with a baby girl   She is sleeping as good as she can   She is driving without any problems   Healthy overall   Working with counseling for her mood. Allergies   Allergen Reactions    Bactrim [Sulfamethoprim] Anaphylaxis and Rash       Current Outpatient Medications   Medication Sig    levETIRAcetam (KEPPRA XR) 500 mg ER tablet TAKE 6 TABLETS BY MOUTH ONCE DAILY     No current facility-administered medications for this visit. Social History     Tobacco Use   Smoking Status Former   Smokeless Tobacco Never       Past Medical History:   Diagnosis Date    Bipolar 1 disorder ([facility] [address]     Depression     bipolar    Epilepsy ([facility] [address]     Falls     Memory disorder Noticed within the past few months i believe due to stress       No past surgical history on file. Family History   Problem Relation Age of Onset    Cancer Other     Seizures Other     Arthritis-rheumatoid Mother     No Known Problems Father     Heart Disease Maternal Grandfather     Seizures Maternal Grandmother     Alzheimer's Disease Maternal Grandmother     Epilepsy Maternal Grandmother     Cancer Paternal Grandmother        Social History     Socioeconomic History    Marital status: UNKNOWN   Tobacco Use    Smoking status: Former    Smokeless tobacco: Never   Substance and Sexual Activity    Alcohol use: No    Drug use: Never    Sexual activity: Yes     Partners: Male     Birth control/protection: I.U.D. Review of Systems   Eyes:  Negative for blurred vision, double vision and photophobia.    Respiratory:  Negative for shortness of breath and wheezing. Cardiovascular:  Negative for chest pain and palpitations. Musculoskeletal:  Negative for falls and joint pain. Neurological:  Negative for dizziness, tingling, seizures, loss of consciousness, weakness and headaches. Psychiatric/Behavioral:  The patient is nervous/anxious. Remainder of comprehensive review is negative. Physical Exam :    Visit Vitals  /84 (BP 1 Location: Left upper arm, BP Patient Position: Sitting, BP Cuff Size: Adult)   Pulse 65   Resp 14   Wt 75.7 kg (166 lb 14.4 oz)   BMI 27.77 kg/m²       General: Well defined, nourished, and groomed individual in no acute distress. Neck: Supple, nontender, no bruits, no pain with resistance to active range of motion. Musculoskeletal: Extremities revealed no edema and had full range of motion of joints. Psych: Good mood and bright affect    NEUROLOGICAL EXAMINATION:    Mental Status: Alert and oriented to person, place, and time    Cranial Nerves:    II, III, IV, VI: Visual acuity grossly intact. Visual fields are normal.    Pupils are equal, round, and reactive to light and accommodation. Extra-ocular movements are full and fluid. Fundoscopic exam was benign, no ptosis or nystagmus. V-XII: Hearing is grossly intact. Facial features are symmetric, with normal sensation and strength. The palate rises symmetrically and the tongue protrudes midline. Sternocleidomastoids 5/5. Motor Examination: Normal tone, bulk, and strength, 5/5 muscle strength throughout. Coordination: Finger to nose was normal. No resting or intention tremor    Gait and Station: Steady while walking. Normal arm swing. No pronator drift. No muscle wasting or fasiculations noted. Reflexes: DTRs 2+ throughout.           Results for orders placed or performed during the hospital encounter of 04/19/17   WET PREP    Specimen: Miscellaneous sample   Result Value Ref Range    Clue cells CLUE CELLS ABSENT      Wet prep NO TRICHOMONAS SEEN     CHRISTOFER, OTHER SOURCES    Specimen: Vagina; Other   Result Value Ref Range    Special Requests: NO SPECIAL REQUESTS      KOH NO YEAST SEEN     URINALYSIS W/MICROSCOPIC   Result Value Ref Range    Color YELLOW/STRAW      Appearance CLEAR CLEAR      Specific gravity 1.028 1.003 - 1.030      pH (UA) 6.5 5.0 - 8.0      Protein NEGATIVE  NEG mg/dL    Glucose NEGATIVE  NEG mg/dL    Ketone NEGATIVE  NEG mg/dL    Bilirubin NEGATIVE  NEG      Blood LARGE (A) NEG      Urobilinogen 1.0 0.2 - 1.0 EU/dL    Nitrites NEGATIVE  NEG      Leukocyte Esterase NEGATIVE  NEG      WBC 0-4 0 - 4 /hpf    RBC 20-50 0 - 5 /hpf    Epithelial cells FEW FEW /lpf    Bacteria NEGATIVE  NEG /hpf    Hyaline cast 2-5 0 - 5 /lpf   CHLAMYDIA / GC AMPLIFICATION   Result Value Ref Range    Sample type SWAB      Source ENDOCERVICAL      Chlamydia amplified NEGATIVE  NEG      N. gonorrhea, amplified NEGATIVE  NEG      Comment        Testing performed by the [employer] Edy CT/NG method, utilizing PCR amplification to identify DNA of the pathogens. This method is not recommended as the sole method of evaluation of cases of sexual abuse nor for other medico-legal indications. CBC W/O DIFF   Result Value Ref Range    WBC 7.5 3.6 - 11.0 K/uL    RBC 4.29 3.80 - 5.20 M/uL    HGB 12.5 11.5 - 16.0 g/dL    HCT 37.6 35.0 - 47.0 %    MCV 87.6 80.0 - 99.0 FL    MCH 29.1 26.0 - 34.0 PG    MCHC 33.2 30.0 - 36.5 g/dL    RDW 12.6 11.5 - 14.5 %    PLATELET 519 962 - 193 K/uL   HCG URINE, QL. - POC   Result Value Ref Range    Pregnancy test,urine (POC) NEGATIVE  NEG         No orders of the defined types were placed in this encounter. 1. 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 Continue to work on stress, mood.    Will get DMV filled out and faxed today             This note will not be viewable in [address]

## 2022-09-14 ENCOUNTER — TELEPHONE (OUTPATIENT)
Dept: NEUROLOGY | Age: 27
End: 2022-09-14

## 2022-09-14 NOTE — TELEPHONE ENCOUNTER
Pharmacy    [address] [place] [address] [address]    [place] [address]   Phone:  [phone]  Fax:  [phone]   LISA #:  --   SHALINI Reason: --     Outpatient Medication Detail     Disp Refills Start End    levETIRAcetam (KEPPRA XR) 500 mg ER tablet 180 Tablet 5 9/14/2022     Sig: TAKE 6 TABLETS BY MOUTH ONCE DAILY    Sent to pharmacy as: levETIRAcetam  mg tablet,extended release 24 hr (KEPPRA XR)    E-Prescribing Status: Receipt confirmed by pharmacy (9/14/2022 11:15 AM EDT)

## 2022-09-14 NOTE — TELEPHONE ENCOUNTER
The pharmacy called to follow up on a refill request for Keppra. She was given an emergency supply and her last day is today.

## 2023-07-13 ENCOUNTER — OFFICE VISIT (OUTPATIENT)
Age: 28
End: 2023-07-13
Payer: COMMERCIAL

## 2023-07-13 VITALS
SYSTOLIC BLOOD PRESSURE: 110 MMHG | HEIGHT: 62 IN | OXYGEN SATURATION: 98 % | HEART RATE: 108 BPM | DIASTOLIC BLOOD PRESSURE: 69 MMHG | RESPIRATION RATE: 20 BRPM | BODY MASS INDEX: 28.34 KG/M2 | WEIGHT: 154 LBS

## 2023-07-13 DIAGNOSIS — G40.309 NONINTRACTABLE GENERALIZED IDIOPATHIC EPILEPSY WITHOUT STATUS EPILEPTICUS (HCC): Primary | ICD-10-CM

## 2023-07-13 PROCEDURE — 99214 OFFICE O/P EST MOD 30 MIN: CPT | Performed by: PSYCHIATRY & NEUROLOGY

## 2023-07-13 RX ORDER — LEVETIRACETAM 500 MG/1
TABLET, EXTENDED RELEASE ORAL
Qty: 180 TABLET | Refills: 5 | Status: SHIPPED | OUTPATIENT
Start: 2023-07-13

## 2023-07-13 NOTE — PROGRESS NOTES
to see if we can wean off Keppra. We did discuss the risk is breakthrough seizure and she notes its a good time in her life because she has some help with her daughter if she were to have a seizure  We will get a sleep deprived EEG  If the sleep deprived EEG demonstrates generalized discharges then we will continue Keppra  If the sleep deprived EEG is normal then we will do a slow wean of Keppra and assisted through repeat a sleep deprived EEG  We will touch base with her after the sleep deprived EEG has been resulted    Ezra Echeverria MD        This note was created using voice recognition software. Despite editing, there may be syntax errors.

## 2023-07-18 ENCOUNTER — PROCEDURE VISIT (OUTPATIENT)
Age: 28
End: 2023-07-18
Payer: COMMERCIAL

## 2023-07-18 DIAGNOSIS — G40.309 NONINTRACTABLE GENERALIZED IDIOPATHIC EPILEPSY WITHOUT STATUS EPILEPTICUS (HCC): Primary | ICD-10-CM

## 2023-07-18 PROCEDURE — 95819 EEG AWAKE AND ASLEEP: CPT | Performed by: PSYCHIATRY & NEUROLOGY

## 2023-07-21 ENCOUNTER — TELEPHONE (OUTPATIENT)
Age: 28
End: 2023-07-21

## 2023-07-21 DIAGNOSIS — Z51.81 MEDICATION MONITORING ENCOUNTER: ICD-10-CM

## 2023-07-21 DIAGNOSIS — G40.309 NONINTRACTABLE GENERALIZED IDIOPATHIC EPILEPSY WITHOUT STATUS EPILEPTICUS (HCC): Primary | ICD-10-CM

## 2023-07-21 NOTE — TELEPHONE ENCOUNTER
Pt notified of Dr. Lisa Garrido' recs. Appt scheduled for SD EEG on 8/17/23 at 0715. Discussed s/s of possible sz and advised to document any changes or abnl occurrences, even if it feels unimportant and notify office.   Mychart msg sent to pt as well

## 2023-07-21 NOTE — TELEPHONE ENCOUNTER
----- Message from Betito Edmondson MD sent at 7/21/2023  1:59 PM EDT -----  Please inform patient her sleep deprived EEG was normal    If she still wishes to wean off Keppra have her start taking 5 tablets daily x2 weeks then 4 tablets daily x2 weeks then 3 tablets daily.   When she gets to the 3 tablet daily dose, repeat sleep deprived EEG and we will decide if we can move further towards off titration

## 2023-07-21 NOTE — PROCEDURES
[facility] [place]   Electroencephalogram Report    Procedure ID: [id number] Procedure Date: 7/18/2023   Patient Name: Shawnie Nyhan YOB: 1995   Procedure Type: Sleep Deprived Medical Record No: [id number]     A sleep deprived EEG is requested in this 24-year-old lady with generalized epilepsy to evaluate for epileptiform abnormality. Medication listed as Keppra    This tracing is obtained during the awake, drowsy, and sleeping states. During wakefulness there are intermittent runs of posteriorly dominant and symmetric low to medium amplitude 10 cps activities which attenuate with eye opening. Lower voltage faster frequency activities are seen symmetrically over the anterior head regions. Hyperventilation little alters the tracing. Intermittent photic stimulation induces symmetric posterior driving responses. Stage II sleep is attained.     Interpretation  This sleep deprived EEG recorded during the awake, drowsy, and sleeping states is normal.          Matt Ram MD

## 2023-08-17 ENCOUNTER — PROCEDURE VISIT (OUTPATIENT)
Age: 28
End: 2023-08-17

## 2023-08-17 DIAGNOSIS — G40.309 NONINTRACTABLE GENERALIZED IDIOPATHIC EPILEPSY WITHOUT STATUS EPILEPTICUS (HCC): Primary | ICD-10-CM

## 2024-08-08 ENCOUNTER — TELEPHONE (OUTPATIENT)
Age: 29
End: 2024-08-08

## 2024-08-08 NOTE — TELEPHONE ENCOUNTER
S/w pt, appt r/s for 8/19/24 arrival 9 am as forms need to be completed within 6 mo of OV and pt's LOV was July 2023

## 2024-08-08 NOTE — TELEPHONE ENCOUNTER
Patient requesting a call to discuss paperwork she received from [facility] that needs to be completed and returned by 8/30/24

## 2024-08-19 ENCOUNTER — CLINICAL DOCUMENTATION (OUTPATIENT)
Age: 29
End: 2024-08-19

## 2024-08-19 ENCOUNTER — OFFICE VISIT (OUTPATIENT)
Age: 29
End: 2024-08-19
Payer: COMMERCIAL

## 2024-08-19 VITALS
HEIGHT: 62 IN | DIASTOLIC BLOOD PRESSURE: 62 MMHG | BODY MASS INDEX: 27.05 KG/M2 | WEIGHT: 147 LBS | HEART RATE: 87 BPM | RESPIRATION RATE: 16 BRPM | OXYGEN SATURATION: 98 % | SYSTOLIC BLOOD PRESSURE: 118 MMHG

## 2024-08-19 DIAGNOSIS — G40.309 NONINTRACTABLE GENERALIZED IDIOPATHIC EPILEPSY WITHOUT STATUS EPILEPTICUS (HCC): Primary | ICD-10-CM

## 2024-08-19 PROCEDURE — 99213 OFFICE O/P EST LOW 20 MIN: CPT | Performed by: PSYCHIATRY & NEUROLOGY

## 2024-08-19 ASSESSMENT — PATIENT HEALTH QUESTIONNAIRE - PHQ9
SUM OF ALL RESPONSES TO PHQ9 QUESTIONS 1 & 2: 0
SUM OF ALL RESPONSES TO PHQ QUESTIONS 1-9: 0
1. LITTLE INTEREST OR PLEASURE IN DOING THINGS: NOT AT ALL
SUM OF ALL RESPONSES TO PHQ QUESTIONS 1-9: 0
2. FEELING DOWN, DEPRESSED OR HOPELESS: NOT AT ALL

## 2024-08-19 NOTE — PROGRESS NOTES
dose of medication. At that time she was changed to the extended release form. She notes that she has been having some twitching in her sleep that has been reported to her. She says that her boyfriend tells her that she has jerking while she is in bed. No description of a convulsion. She also has some minor jerks in the morning as well. This is the highest dose of Keppra she has been on. She's not been on any other antiepileptic drugs.     Previous antiseizure medication  Keppra    Current antiseizure medication  None  Examination  /62   Pulse 87   Resp 16   Ht 1.575 m (5' 2\")   Wt 66.7 kg (147 lb)   SpO2 98%   BMI 26.89 kg/m²   She is a pleasant engaging lady.  She is awake alert and oriented.  Speech and language normal.  Cognition normal.  Cranial nerves intact.  No ataxia    Impression/Plan  Generalized epilepsy seizure-free for a number of years and off medicine for about a year now  DMV forms completed  Leave follow-up open for now and certainly if she has trouble she will let us know  If she needs new DMV forms completed she will let us know and we will fit her in to be seen      Andra Jean MD        This note was created using voice recognition software. Despite editing, there may be syntax errors.

## 2024-08-19 NOTE — PROGRESS NOTES
Faxed over completed DMV form to [phone]. A copy of the form was given to the patient as well during appointment.